# Patient Record
Sex: MALE | Race: WHITE | NOT HISPANIC OR LATINO | Employment: FULL TIME | URBAN - METROPOLITAN AREA
[De-identification: names, ages, dates, MRNs, and addresses within clinical notes are randomized per-mention and may not be internally consistent; named-entity substitution may affect disease eponyms.]

---

## 2022-08-22 ENCOUNTER — TELEPHONE (OUTPATIENT)
Dept: SCHEDULING | Facility: IMAGING CENTER | Age: 72
End: 2022-08-22

## 2022-08-30 ENCOUNTER — HOSPITAL ENCOUNTER (OUTPATIENT)
Dept: LAB | Facility: MEDICAL CENTER | Age: 72
End: 2022-08-30
Payer: MEDICARE

## 2022-08-30 ENCOUNTER — OFFICE VISIT (OUTPATIENT)
Dept: MEDICAL GROUP | Facility: MEDICAL CENTER | Age: 72
End: 2022-08-30
Payer: MEDICARE

## 2022-08-30 ENCOUNTER — HOSPITAL ENCOUNTER (OUTPATIENT)
Dept: RADIOLOGY | Facility: MEDICAL CENTER | Age: 72
End: 2022-08-30
Payer: MEDICARE

## 2022-08-30 VITALS
BODY MASS INDEX: 26.22 KG/M2 | HEART RATE: 64 BPM | SYSTOLIC BLOOD PRESSURE: 120 MMHG | OXYGEN SATURATION: 97 % | WEIGHT: 177 LBS | RESPIRATION RATE: 16 BRPM | HEIGHT: 69 IN | TEMPERATURE: 97.8 F | DIASTOLIC BLOOD PRESSURE: 76 MMHG

## 2022-08-30 DIAGNOSIS — C09.9 MALIGNANT NEOPLASM OF TONSIL (HCC): ICD-10-CM

## 2022-08-30 DIAGNOSIS — I10 ESSENTIAL (PRIMARY) HYPERTENSION: ICD-10-CM

## 2022-08-30 DIAGNOSIS — G89.29 CHRONIC RIGHT-SIDED LOW BACK PAIN WITH RIGHT-SIDED SCIATICA: ICD-10-CM

## 2022-08-30 DIAGNOSIS — E03.9 HYPOTHYROIDISM, UNSPECIFIED TYPE: ICD-10-CM

## 2022-08-30 DIAGNOSIS — M54.41 CHRONIC RIGHT-SIDED LOW BACK PAIN WITH RIGHT-SIDED SCIATICA: ICD-10-CM

## 2022-08-30 DIAGNOSIS — Z13.6 SCREENING FOR CARDIOVASCULAR CONDITION: ICD-10-CM

## 2022-08-30 DIAGNOSIS — M48.02 CERVICAL STENOSIS OF SPINE: ICD-10-CM

## 2022-08-30 DIAGNOSIS — Z85.818 HISTORY OF MALIGNANT NEOPLASM OF TONSIL: ICD-10-CM

## 2022-08-30 DIAGNOSIS — R04.0 BLEEDING NOSE: ICD-10-CM

## 2022-08-30 PROBLEM — K43.9 HERNIA OF ABDOMINAL WALL: Status: RESOLVED | Noted: 2022-08-30 | Resolved: 2022-08-30

## 2022-08-30 PROBLEM — K21.9 GERD (GASTROESOPHAGEAL REFLUX DISEASE): Status: ACTIVE | Noted: 2022-08-30

## 2022-08-30 PROBLEM — M54.9 BACK PAIN: Status: ACTIVE | Noted: 2022-08-30

## 2022-08-30 PROBLEM — N40.1 BENIGN PROSTATIC HYPERPLASIA (BPH) WITH POST-VOID DRIBBLING: Status: ACTIVE | Noted: 2022-08-30

## 2022-08-30 PROBLEM — J98.9 RESPIRATION DISORDER: Status: RESOLVED | Noted: 2022-08-30 | Resolved: 2022-08-30

## 2022-08-30 PROBLEM — N39.43 BENIGN PROSTATIC HYPERPLASIA (BPH) WITH POST-VOID DRIBBLING: Status: ACTIVE | Noted: 2022-08-30

## 2022-08-30 PROBLEM — G47.33 OBSTRUCTIVE SLEEP APNEA: Status: ACTIVE | Noted: 2022-08-30

## 2022-08-30 PROBLEM — J98.9 RESPIRATION DISORDER: Status: ACTIVE | Noted: 2022-08-30

## 2022-08-30 PROBLEM — M54.2 NECK PAIN: Status: ACTIVE | Noted: 2022-08-30

## 2022-08-30 PROBLEM — K43.9 HERNIA OF ABDOMINAL WALL: Status: ACTIVE | Noted: 2022-08-30

## 2022-08-30 LAB
ALBUMIN SERPL BCP-MCNC: 4.4 G/DL (ref 3.2–4.9)
ALBUMIN/GLOB SERPL: 1.7 G/DL
ALP SERPL-CCNC: 66 U/L (ref 30–99)
ALT SERPL-CCNC: 23 U/L (ref 2–50)
ANION GAP SERPL CALC-SCNC: 11 MMOL/L (ref 7–16)
AST SERPL-CCNC: 24 U/L (ref 12–45)
BILIRUB SERPL-MCNC: 0.6 MG/DL (ref 0.1–1.5)
BUN SERPL-MCNC: 16 MG/DL (ref 8–22)
CALCIUM SERPL-MCNC: 9.7 MG/DL (ref 8.5–10.5)
CHLORIDE SERPL-SCNC: 102 MMOL/L (ref 96–112)
CHOLEST SERPL-MCNC: 211 MG/DL (ref 100–199)
CO2 SERPL-SCNC: 27 MMOL/L (ref 20–33)
CREAT SERPL-MCNC: 0.95 MG/DL (ref 0.5–1.4)
ERYTHROCYTE [DISTWIDTH] IN BLOOD BY AUTOMATED COUNT: 47.5 FL (ref 35.9–50)
GFR SERPLBLD CREATININE-BSD FMLA CKD-EPI: 85 ML/MIN/1.73 M 2
GLOBULIN SER CALC-MCNC: 2.6 G/DL (ref 1.9–3.5)
GLUCOSE SERPL-MCNC: 94 MG/DL (ref 65–99)
HCT VFR BLD AUTO: 47 % (ref 42–52)
HDLC SERPL-MCNC: 33 MG/DL
HGB BLD-MCNC: 15.6 G/DL (ref 14–18)
LDLC SERPL CALC-MCNC: 156 MG/DL
MCH RBC QN AUTO: 29.9 PG (ref 27–33)
MCHC RBC AUTO-ENTMCNC: 33.2 G/DL (ref 33.7–35.3)
MCV RBC AUTO: 90.2 FL (ref 81.4–97.8)
PLATELET # BLD AUTO: 242 K/UL (ref 164–446)
PMV BLD AUTO: 11.8 FL (ref 9–12.9)
POTASSIUM SERPL-SCNC: 3.9 MMOL/L (ref 3.6–5.5)
PROT SERPL-MCNC: 7 G/DL (ref 6–8.2)
RBC # BLD AUTO: 5.21 M/UL (ref 4.7–6.1)
SODIUM SERPL-SCNC: 140 MMOL/L (ref 135–145)
TRIGL SERPL-MCNC: 111 MG/DL (ref 0–149)
TSH SERPL DL<=0.005 MIU/L-ACNC: 2.17 UIU/ML (ref 0.38–5.33)
WBC # BLD AUTO: 7.5 K/UL (ref 4.8–10.8)

## 2022-08-30 PROCEDURE — 99204 OFFICE O/P NEW MOD 45 MIN: CPT

## 2022-08-30 PROCEDURE — 36415 COLL VENOUS BLD VENIPUNCTURE: CPT

## 2022-08-30 PROCEDURE — 72040 X-RAY EXAM NECK SPINE 2-3 VW: CPT

## 2022-08-30 PROCEDURE — 85027 COMPLETE CBC AUTOMATED: CPT

## 2022-08-30 PROCEDURE — 80053 COMPREHEN METABOLIC PANEL: CPT

## 2022-08-30 PROCEDURE — 84443 ASSAY THYROID STIM HORMONE: CPT

## 2022-08-30 PROCEDURE — 80061 LIPID PANEL: CPT

## 2022-08-30 RX ORDER — CETIRIZINE HYDROCHLORIDE 10 MG/1
TABLET ORAL
COMMUNITY
Start: 2022-06-22 | End: 2023-07-19 | Stop reason: SDUPTHER

## 2022-08-30 RX ORDER — FLUTICASONE PROPIONATE 50 MCG
2 SPRAY, SUSPENSION (ML) NASAL DAILY
Qty: 16 G | Refills: 12 | Status: SHIPPED | OUTPATIENT
Start: 2022-08-30 | End: 2022-09-07 | Stop reason: SDUPTHER

## 2022-08-30 RX ORDER — TAMSULOSIN HYDROCHLORIDE 0.4 MG/1
CAPSULE ORAL
COMMUNITY
Start: 2022-06-08 | End: 2022-08-30

## 2022-08-30 RX ORDER — PANTOPRAZOLE SODIUM 40 MG/1
40 TABLET, DELAYED RELEASE ORAL
COMMUNITY
Start: 2022-06-09 | End: 2022-12-19 | Stop reason: SDUPTHER

## 2022-08-30 RX ORDER — THYROID 15 MG/1
15 TABLET ORAL DAILY
COMMUNITY
End: 2022-08-30

## 2022-08-30 RX ORDER — LEVOTHYROXINE SODIUM 150 MCG
TABLET ORAL
COMMUNITY
Start: 2022-06-08 | End: 2022-08-30

## 2022-08-30 RX ORDER — ASPIRIN 81 MG/1
81 TABLET ORAL SEE ADMIN INSTRUCTIONS
COMMUNITY

## 2022-08-30 RX ORDER — FLUTICASONE PROPIONATE 50 MCG
SPRAY, SUSPENSION (ML) NASAL DAILY
COMMUNITY
End: 2022-08-30 | Stop reason: SDUPTHER

## 2022-08-30 RX ORDER — LEVOTHYROXINE SODIUM 13 UG/1
150 CAPSULE ORAL
COMMUNITY
End: 2022-08-30 | Stop reason: SDUPTHER

## 2022-08-30 RX ORDER — MULTIVIT WITH MINERALS/LUTEIN
TABLET ORAL
COMMUNITY

## 2022-08-30 RX ORDER — FLUTICASONE PROPIONATE AND SALMETEROL XINAFOATE 230; 21 UG/1; UG/1
1 AEROSOL, METERED RESPIRATORY (INHALATION) 2 TIMES DAILY
Qty: 12 G | Refills: 12 | Status: SHIPPED | OUTPATIENT
Start: 2022-08-30 | End: 2022-08-31

## 2022-08-30 RX ORDER — NAPROXEN 500 MG/1
500 TABLET ORAL
COMMUNITY
End: 2022-08-30

## 2022-08-30 RX ORDER — CETIRIZINE HYDROCHLORIDE 10 MG/1
1 TABLET ORAL DAILY
COMMUNITY
Start: 2022-06-09 | End: 2022-08-30

## 2022-08-30 RX ORDER — MULTIVITAMIN WITH IRON
TABLET ORAL
COMMUNITY

## 2022-08-30 RX ORDER — PANTOPRAZOLE SODIUM 40 MG/1
TABLET, DELAYED RELEASE ORAL
COMMUNITY
Start: 2022-06-23 | End: 2022-08-30

## 2022-08-30 RX ORDER — AMLODIPINE BESYLATE AND ATORVASTATIN CALCIUM 5; 10 MG/1; MG/1
1 TABLET, FILM COATED ORAL DAILY
COMMUNITY
End: 2022-08-30

## 2022-08-30 RX ORDER — FLUTICASONE PROPIONATE AND SALMETEROL XINAFOATE 230; 21 UG/1; UG/1
2 AEROSOL, METERED RESPIRATORY (INHALATION)
COMMUNITY
Start: 2022-06-09 | End: 2022-08-30

## 2022-08-30 RX ORDER — PREDNISOLONE ACETATE 10 MG/ML
SUSPENSION/ DROPS OPHTHALMIC
COMMUNITY
Start: 2022-08-22 | End: 2023-03-13

## 2022-08-30 RX ORDER — FLUTICASONE PROPIONATE AND SALMETEROL XINAFOATE 230; 21 UG/1; UG/1
AEROSOL, METERED RESPIRATORY (INHALATION)
COMMUNITY
Start: 2022-06-22 | End: 2022-08-30 | Stop reason: SDUPTHER

## 2022-08-30 RX ORDER — AMLODIPINE BESYLATE 5 MG/1
5 TABLET ORAL DAILY
COMMUNITY
End: 2022-08-30 | Stop reason: SDUPTHER

## 2022-08-30 RX ORDER — AMLODIPINE BESYLATE 5 MG/1
5 TABLET ORAL DAILY
Qty: 90 TABLET | Refills: 3 | Status: SHIPPED | OUTPATIENT
Start: 2022-08-30 | End: 2023-03-30 | Stop reason: SDUPTHER

## 2022-08-30 RX ORDER — TAMSULOSIN HYDROCHLORIDE 0.4 MG/1
CAPSULE ORAL
COMMUNITY
Start: 2022-06-08

## 2022-08-30 RX ORDER — PANTOPRAZOLE SODIUM 40 MG/1
FOR SUSPENSION ORAL
COMMUNITY
End: 2022-08-30

## 2022-08-30 RX ORDER — OFLOXACIN 3 MG/ML
SOLUTION/ DROPS OPHTHALMIC
COMMUNITY
Start: 2022-08-15 | End: 2023-03-13

## 2022-08-30 RX ORDER — LEVOTHYROXINE SODIUM 13 UG/1
150 CAPSULE ORAL DAILY
Qty: 90 CAPSULE | Refills: 3 | Status: SHIPPED | OUTPATIENT
Start: 2022-08-30 | End: 2023-03-30 | Stop reason: SDUPTHER

## 2022-08-30 ASSESSMENT — ENCOUNTER SYMPTOMS
HEARTBURN: 0
VOMITING: 0
ABDOMINAL PAIN: 0
FEVER: 0
PALPITATIONS: 0
COUGH: 0
CHILLS: 0
NAUSEA: 1
SHORTNESS OF BREATH: 0
NECK PAIN: 1
ORTHOPNEA: 0

## 2022-08-30 ASSESSMENT — PATIENT HEALTH QUESTIONNAIRE - PHQ9: CLINICAL INTERPRETATION OF PHQ2 SCORE: 0

## 2022-08-31 RX ORDER — FLUTICASONE PROPIONATE AND SALMETEROL XINAFOATE 230; 21 UG/1; UG/1
2 AEROSOL, METERED RESPIRATORY (INHALATION) 2 TIMES DAILY
Qty: 12 G | Refills: 12 | Status: SHIPPED | OUTPATIENT
Start: 2022-08-31 | End: 2022-09-06 | Stop reason: SDUPTHER

## 2022-09-02 ENCOUNTER — SUPERVISING PHYSICIAN REVIEW (OUTPATIENT)
Dept: MEDICAL GROUP | Facility: MEDICAL CENTER | Age: 72
End: 2022-09-02
Payer: MEDICARE

## 2022-09-02 NOTE — PROGRESS NOTES
I have reviewed and agree with history, assessment, and plan for office encounter on 8/302022 with ALPESH Manzo.    Face to face encounter/direct observation: no    Suggested changes to plan or follow-up: none

## 2022-09-06 RX ORDER — FLUTICASONE PROPIONATE AND SALMETEROL XINAFOATE 230; 21 UG/1; UG/1
2 AEROSOL, METERED RESPIRATORY (INHALATION) 2 TIMES DAILY
Qty: 180 EACH | Refills: 3 | Status: SHIPPED | OUTPATIENT
Start: 2022-09-06 | End: 2023-10-09

## 2022-09-07 ENCOUNTER — HOSPITAL ENCOUNTER (OUTPATIENT)
Dept: RADIOLOGY | Facility: MEDICAL CENTER | Age: 72
End: 2022-09-07
Payer: MEDICARE

## 2022-09-07 RX ORDER — FLUTICASONE PROPIONATE 50 MCG
2 SPRAY, SUSPENSION (ML) NASAL DAILY
Qty: 48 G | Refills: 3 | Status: SHIPPED | OUTPATIENT
Start: 2022-09-07 | End: 2023-10-09

## 2022-09-08 ENCOUNTER — APPOINTMENT (OUTPATIENT)
Dept: RADIATION ONCOLOGY | Facility: MEDICAL CENTER | Age: 72
End: 2022-09-08
Attending: RADIOLOGY
Payer: MEDICARE

## 2022-09-27 ENCOUNTER — PHYSICAL THERAPY (OUTPATIENT)
Dept: PHYSICAL THERAPY | Facility: MEDICAL CENTER | Age: 72
End: 2022-09-27
Payer: MEDICARE

## 2022-09-27 DIAGNOSIS — M48.02 CERVICAL STENOSIS OF SPINE: ICD-10-CM

## 2022-09-27 PROCEDURE — 97110 THERAPEUTIC EXERCISES: CPT

## 2022-09-27 PROCEDURE — 97162 PT EVAL MOD COMPLEX 30 MIN: CPT

## 2022-09-27 ASSESSMENT — ENCOUNTER SYMPTOMS
PAIN SCALE AT LOWEST: 0
PAIN SCALE AT HIGHEST: 9
PAIN SCALE: 0

## 2022-09-27 NOTE — OP THERAPY EVALUATION
Outpatient Physical Therapy  INITIAL EVALUATION    [facility] Outpatient Physical Therapy  [address]  [place] [address]  Phone:  [phone]  Fax:  [phone]    Date of Evaluation: 09/27/2022    Patient: Randall Copeland  YOB: 1950  MRN: [id number]     Referring Provider: MELVIN Sandoval  [address]  [place] [address]   Referring Diagnosis Cervical stenosis of spine [M48.02]     Time Calculation  Start time: 0900  Stop time: 0943 Time Calculation (min): 43 minutes         Chief Complaint: Neck Problem    Visit Diagnoses     ICD-10-CM   1. Cervical stenosis of spine  M48.02       Date of onset of impairment: No data found    Subjective:   History of Present Illness:     Mechanism of injury:  Patient is a 71 year old male with a PMH including: GERD, depression, obstructive sleep apnea, hypothyroidism, malignant neoplasm tonsil (11/13)-pt had tonsillectomy; pt has ENT appt in Nov/late Oct. No O2/CPAP.    Pt presents to therapy with complaints of worsening neck pain, which initiated 3-4 years ago. This has been occurring intermittently over the years. Pt describing s/s usually occur with movement; sometimes with sitting at rest. Reports the s/s are unpredictable. Overall, reports worsening are stable. Denies numbness/tingling in extremities. Pain is described posterior to R ear at base of skull. Pt reports he did have radiation to R jaw  and R base of skull, reports he did have extensive radiation to this area. Pt reporting has tightness in both sides of jaw which occurs intermittently. Pt reporting she does have intermittent signs of pins/needles in chest, back, and legs (occurred 6 months ago) sometimes triggered by heat and stress.    Currently denies changes in bowel and bladder, saddle anesthesia, significant weight changes, chills/night sweats, nausea and vomiting, and unexplained fatigue. Pt currently denies: Dizziness,  diploplia, dysphasia, dysarthria, drop attacks, nausea, nystagmus, vision changes.      Headache comments: reports tension headaches on R  Sleep disturbance:  Not disrupted  Pain:     Current pain ratin    At best pain ratin    At worst pain ratin    Location:  R sided neck base of skull behind R ear    Quality: sore.    Progression:  Stable    Pain Comments::  Aggravatinx/day, rotation to R intermittently, driving,     Relieving: turning to the L stretching in rotation and circumduction of neck   Diagnostic Tests:     Diagnostic Tests Comments:  Cervical x ray 22:  FINDINGS:     The bones are diffusely demineralized.  There is no evidence of acute fracture.  There is no gross malalignment. There is slight degenerative anterolisthesis of C4 on C5 and C5 on C6.  There is loss of intervertebral disc height throughout the cervical spine, relatively sparing C2-C3 and C3-C4 and most severe at C4-C5.  There are changes of facet arthropathy.  No soft tissue abnormality is identified.     IMPRESSION:     1.  Multilevel multifactorial degenerative changes  2.  Osteopenia    Treatments:     None      Treatment Comments:  Self massage and stretching  Activities of Daily Living:     Patient reported ADL status: Patient's current daily routine includes:  Work: manager -full time; sitting for work (most time sitting)  Exercise: No current exercise routine in place; was completing resistance training but has not completed in a few weeks due to medical reasons.     Indep with ADL's.        Patient Goals:     Patient goals for therapy:  Decreased pain    Past Medical History:   Diagnosis Date    Benign prostatic hyperplasia (BPH) with post-void dribbling     Bleeding nose     Cervical stenosis of spine     Essential (primary) hypertension     GERD (gastroesophageal reflux disease)     Hernia of abdominal wall     inguinal    Hypothyroid     Malignant neoplasm of tonsil (HCC) 2013    Respiration disorder      idiopathic upper airway disease     No past surgical history on file.  Social History     Tobacco Use    Smoking status: Never    Smokeless tobacco: Never   Substance Use Topics    Alcohol use: Not on file     Comment: occasionally     Family and Occupational History     Socioeconomic History    Marital status:      Spouse name: Not on file    Number of children: Not on file    Years of education: Not on file    Highest education level: Not on file   Occupational History    Not on file       Objective     Postural Observations  Correction of posture: has no consistent effect    Additional Postural Observation Details  Forward head and rounded shoulders    Shoulder Screen    Shoulder active range of motion within functional limits.  Shoulder range of motion within functional limits with the following exceptions: Hand to full flexion, hand BTB and BTH WFL   Thoracic mobility limited in sagittal plane    Neurological Testing     Dermatome testing   Cervical (left)   All left cervical dermatomes intact    Cervical (right)   All right cervical dermatomes intact    Palpation     Additional Palpation Details  Scar tissue at R jawline  No TTP to palpation at TMJ  TTP R c/s paraspinals and at Tp's of c/s throughout    Active Range of Motion     Cervical Spine   Flexion: within functional limits  Extension: decreased  Retraction: decreased  Left lateral flexion: Active left cervical lateral flexion: 24 deg.  Right lateral flexion: Active right cervical lateral flexion: 23 deg; reproduces pain.  Left rotation: Active left cervical rotation: 44 deg.  Right rotation: Active right cervical rotation: 45 deg; ERP.      Therapeutic Exercises (CPT 12196):     1. pt education, re: PT exam findings    2. posture education, x2 min, hep    Time-based treatments/modalities:    Physical Therapy Timed Treatment Charges  Therapeutic exercise minutes (CPT 62214): 13 minutes      Assessment, Response and Plan:   Impairments:  abnormal or restricted ROM, activity intolerance, impaired physical strength and lacks appropriate home exercise program    Assessment details:  Patient is a pleasant and cooperative 71 year old male who presents to therapy with PMH remarkable for malignant tonsil; he will be following up with radiation oncology in near future. Does have hx of extensive radiation to R side of neck/jaw to lymph node and unsure if this is related to s/s. Additional c/o R sided headaches. C/s x ray remarkable for: slight degenerative anterolisthesis of C4 on C5 and C5 on C6, degenerative changes and osteopenia. Pt currently working full time at [employer] job, sits majority of time. S/s reported to be stable. Exam findings suggestive of poor postural awareness with forward head and rounded shoulders, impaired c/s and t/s ROM, periscapular weakness. Pt may benefit from skilled physical therapy in order to address above impairments in order to improve QOL and return to reported ADL's.     Barriers to therapy:  Psychosocial  Prognosis: fair    Goals:   Short Term Goals:   1. Pt will be independent with written HEP.      Short term goal time span:  2-4 weeks      Long Term Goals:    1. Pt will be independent with written HEP.  2. Pt will have a sig improvement in NDI score >/= MDC 5 pts or greater (eval: 9)  3. Pt will have decrease in HA's by 50% or greater to improve QOL and work related tasks.  4. Pt will be able to rotate head bilaterally without increase in baseline s/s in order to drive safely and back up the car.      Long term goal time span:  6-8 weeks    Plan:   Therapy options:  Physical therapy treatment to continue  Planned therapy interventions:  Neuromuscular Re-education (CPT 25794), Manual Therapy (CPT 99187), Therapeutic Exercise (CPT 98061) and Therapeutic Activities (CPT 51919)  Frequency: 1-2x/wk.  Duration in weeks:  8  Discussed with:  Patient  Plan details:  UPOC: 11/25/22    Functional Assessment Used  Neck Disability  Total: 9     Referring provider co-signature:  I have reviewed this plan of care and my co-signature certifies the need for services.    Certification Period: 09/27/2022 to  11/25/22    Physician Signature: ________________________________ Date: ______________

## 2022-10-06 ENCOUNTER — PHYSICAL THERAPY (OUTPATIENT)
Dept: PHYSICAL THERAPY | Facility: MEDICAL CENTER | Age: 72
End: 2022-10-06
Payer: MEDICARE

## 2022-10-06 DIAGNOSIS — M48.02 CERVICAL STENOSIS OF SPINE: ICD-10-CM

## 2022-10-06 PROCEDURE — 97110 THERAPEUTIC EXERCISES: CPT

## 2022-10-06 PROCEDURE — 97140 MANUAL THERAPY 1/> REGIONS: CPT

## 2022-10-06 NOTE — OP THERAPY DAILY TREATMENT
"  Outpatient Physical Therapy  DAILY TREATMENT     [facility] Outpatient Physical Therapy  [address]  [place] [address]  Phone:  [phone]  Fax:  [phone]    Date: 10/06/2022    Patient: Randall Copeland  YOB: 1950  MRN: [id number]     Time Calculation    Start time: 0948  Stop time: 1041 Time Calculation (min): 53 minutes         Chief Complaint: Neck Problem    Visit #: 2    SUBJECTIVE:  Pt reporting about 1-2/10 today; does not have any sharp pain and is not \"triggered.\" Pain is triggered grossly 3x/wk.     OBJECTIVE:  Current objective measures:       Thoracic mobility limited in sagittal and transverse plane  Poor postural awareness    From eval:  Active Range of Motion      Cervical Spine   Flexion: within functional limits  Extension: decreased  Retraction: decreased  Left lateral flexion: Active left cervical lateral flexion: 24 deg.  Right lateral flexion: Active right cervical lateral flexion: 23 deg; reproduces pain.  Left rotation: Active left cervical rotation: 44 deg.  Right rotation: Active right cervical rotation: 45 deg; ERP.    Therapeutic Exercises (CPT 28808):     1. pt education, re: PT exam findings    2. posture education, x2 min, reviewed    3. UBE, x5 min; rodolfo every 60 sec, cardiovascular warm up    4. t/s rotations in SL, x15 L 2x15 R, restricted to R>L; hep    5. open book pec stretch, alt UE's in hooklying, x 20x ea, open book>hep    7. flasher, orange>pink TB x2 min ea, hep    8. pt education, re: emphasis with trial of PT on strength and ROM      Therapeutic Exercise Summary: Access Code: [email or web address]  URL: [email or web address]  Date: 10/06/2022  Prepared by: Vel Pelaez    Exercises  Sidelying Thoracic Lumbar Rotation - 2 x daily - 7 x weekly - 2 sets - 15 reps  Open Book Chest Rotation Stretch on Foam 1/2 Roll - 2 x daily - 7 x weekly - 2 sets - 15 reps  Shoulder External Rotation and Scapular Retraction with " "Resistance - 2-3 x daily - 7 x weekly    Pt performed these exercises with instruction and SPV.  Provided handout with these exercises for daily HEP.        Time-based treatments/modalities:    Physical Therapy Timed Treatment Charges  Manual therapy minutes (CPT 06316): 12 minutes  Therapeutic exercise minutes (CPT 83975): 41 minutes      Pain rating (1-10) before treatment:  1-2/10  Pain rating (1-10) after treatment:  \"feels looser\"  Pain location:  Pain is described posterior to R ear at base of skull. Intermit tension VEGAS's on R  Pain Comments: Aggravatinx/day, rotation to R intermittently, driving      ASSESSMENT:   Response to treatment:   Poor postural awareness while seated with frequent cuing for corrections while in clinic. Thorough education today re: relationship b/w shoulder girdle, cervical and thoracic spine. No increase in discomfort following session with improved transverse t/s mobility following rotational ex. Does require repeated cuing due to poor postural awareness in session. Will continue to focus on mobility, c/s and periscapular strengthening.       PLAN/RECOMMENDATIONS:   Plan for treatment: therapy treatment to continue next visit.  Planned interventions for next visit: continue with current treatment.  Review hep for compliance; add DNF ex       "

## 2022-10-11 ENCOUNTER — PHYSICAL THERAPY (OUTPATIENT)
Dept: PHYSICAL THERAPY | Facility: MEDICAL CENTER | Age: 72
End: 2022-10-11
Payer: MEDICARE

## 2022-10-11 DIAGNOSIS — M48.02 CERVICAL STENOSIS OF SPINE: ICD-10-CM

## 2022-10-11 PROCEDURE — 97110 THERAPEUTIC EXERCISES: CPT

## 2022-10-11 NOTE — OP THERAPY DAILY TREATMENT
Outpatient Physical Therapy  DAILY TREATMENT     [facility] Outpatient Physical Therapy  [address]  [place] [address]  Phone:  [phone]  Fax:  [phone]    Date: 10/11/2022    Patient: Randall Copeland  YOB: 1950  MRN: [id number]     Time Calculation    Start time: 1500  Stop time: 1532 Time Calculation (min): 32 minutes         Chief Complaint: Neck Problem    Visit #: 3    SUBJECTIVE:  Pt reporting was triggered on three occasions but still is unsure if there are changes.    OBJECTIVE:  Current objective measures:       Thoracic mobility limited in sagittal and transverse plane  Poor postural awareness    From eval:  Active Range of Motion      Cervical Spine   Flexion: within functional limits  Extension: decreased  Retraction: decreased  Left lateral flexion: Active left cervical lateral flexion: 24 deg.  Right lateral flexion: Active right cervical lateral flexion: 23 deg; reproduces pain.  Left rotation: Active left cervical rotation: 44 deg.  Right rotation: Active right cervical rotation: 45 deg; ERP.    Therapeutic Exercises (CPT 35712):     1. pt education, re: PT exam findings    2. posture education, x2 min, reviewed    3. UBE, x2.5 min; rodolfo every 60 sec, cardiovascular warm up    4. t/s rotations in SL, x15 B, restricted to R>L-improved compared to last session; reviewed    5. open book pec stretch, alt UE's in hooklying, x 20x ea, reviewed    6. DNF in lying, 10x5 sec hold, added to hep; fatigue and sore    7. flasher, orange>pink TB x2 min ea, demo for corrections    8. pt education, re: emphasis with trial of PT on strength and ROM, reviewed      Therapeutic Exercise Summary: Access Code: [email or web address]  URL: [email or web address]  Date: 10/06/2022  Prepared by: Vel Pelaez    Exercises  Sidelying Thoracic Lumbar Rotation - 2 x daily - 7 x weekly - 2 sets - 15 reps  Open Book Chest Rotation Stretch on Foam 1/2 Roll - 2 x daily - 7  "x weekly - 2 sets - 15 reps  Shoulder External Rotation and Scapular Retraction with Resistance - 2-3 x daily - 7 x weekly    Pt performed these exercises with instruction and SPV.  Provided handout with these exercises for daily HEP.        Time-based treatments/modalities:    Physical Therapy Timed Treatment Charges  Therapeutic exercise minutes (CPT 40305): 32 minutes      Pain rating (1-10) before treatment:  1-2/10  Pain rating (1-10) after treatment:  \"feels looser\"  Pain location:  Pain is described posterior to R ear at base of skull. Intermit tension VEGAS's on R  Pain Comments: Aggravatinx/day, rotation to R intermittently, driving      ASSESSMENT:   Response to treatment:   Abbreviated session due to fire alarm with delayed start time with pt.   Pt demonstrating mod carryover of flasher with cuing required; good carryover of remaining hep. Pt demo sig DNF weakness in lying with reports of B neck pain with attempting to lift head while in lying -will continue to strengthen as tolerated in addition to periscapular strengthening in upcoming sessions.      PLAN/RECOMMENDATIONS:   Plan for treatment: therapy treatment to continue next visit.  Planned interventions for next visit: continue with current treatment.  Review hep and DNF ex    "

## 2022-10-13 ENCOUNTER — PHYSICAL THERAPY (OUTPATIENT)
Dept: PHYSICAL THERAPY | Facility: MEDICAL CENTER | Age: 72
End: 2022-10-13
Payer: MEDICARE

## 2022-10-13 DIAGNOSIS — M48.02 CERVICAL STENOSIS OF SPINE: ICD-10-CM

## 2022-10-13 PROCEDURE — 97110 THERAPEUTIC EXERCISES: CPT

## 2022-10-13 NOTE — OP THERAPY DAILY TREATMENT
Outpatient Physical Therapy  DAILY TREATMENT     [facility] Outpatient Physical Therapy  [address]  [place] [address]  Phone:  [phone]  Fax:  [phone]    Date: 10/13/2022    Patient: Randall Copeland  YOB: 1950  MRN: [id number]     Time Calculation    Start time: 1135  Stop time: 1200 Time Calculation (min): 25 minutes         Chief Complaint: Neck Problem    Visit #: 4    SUBJECTIVE:  Patient reports that he was confused about appointment time and arrived 20 minutes late. No complaints since last treatment session. States that he continues to get cramps in his right suboccipital region at least 3 times a week. Seems to be associated with looking right, DNF exercise, and sometimes with driving. His goal is to decrease the frequency of neck cramping.     OBJECTIVE:  Current objective measures:           Therapeutic Exercises (CPT 70546):     6. DNF in lying, 10x5 sec hold, reviewed in supine, encouraged to perform at 50% of stretch to avoid cramping.    10. Rows, 2 x 10, pink to purple TB, added to HEP    11. Shoulder extension, 2 x 10, pink to purple TB, added to HEP    12. Pec stretch in corner, 2 x 30 seconds, added to HEP      Therapeutic Exercise Summary: Access Code: [email or web address]  URL: [email or web address]  Date: 10/06/2022  Prepared by: Vel Pelaez    Exercises  Sidelying Thoracic Lumbar Rotation - 2 x daily - 7 x weekly - 2 sets - 15 reps  Open Book Chest Rotation Stretch on Foam 1/2 Roll - 2 x daily - 7 x weekly - 2 sets - 15 reps  Shoulder External Rotation and Scapular Retraction with Resistance - 2-3 x daily - 7 x weekly    Pt performed these exercises with instruction and SPV.  Provided handout with these exercises for daily HEP.        Time-based treatments/modalities:    Physical Therapy Timed Treatment Charges  Therapeutic exercise minutes (CPT 52004): 25 minutes      ASSESSMENT:   Response to treatment: Session was shorted due  to patient arrival time. Updated HEP to improve posture. Patient tolerated well without increase in pain or neck cramping.     PLAN/RECOMMENDATIONS:   Plan for treatment: therapy treatment to continue next visit.  Planned interventions for next visit: continue with current treatment.

## 2022-10-20 ENCOUNTER — PHYSICAL THERAPY (OUTPATIENT)
Dept: PHYSICAL THERAPY | Facility: MEDICAL CENTER | Age: 72
End: 2022-10-20
Payer: MEDICARE

## 2022-10-20 DIAGNOSIS — M48.02 CERVICAL STENOSIS OF SPINE: ICD-10-CM

## 2022-10-20 PROCEDURE — 97110 THERAPEUTIC EXERCISES: CPT

## 2022-10-20 NOTE — OP THERAPY DAILY TREATMENT
Outpatient Physical Therapy  DAILY TREATMENT     [facility] Outpatient Physical Therapy  [address]  [place] [address]  Phone:  [phone]  Fax:  [phone]    Date: 10/20/2022    Patient: Randall Copeland  YOB: 1950  MRN: [id number]     Time Calculation    Start time: 0817              Chief Complaint: Neck Problem    Visit #: 5    SUBJECTIVE:  Pt presenting to therapy stating that he does not know if its incidental but has noticed decreased frequency of pain. Reports he is able to avoid having acute cramping pain by completing his exercises.      OBJECTIVE:  Current objective measures:       Forward head position    Therapeutic Exercises (CPT 56767):     1. UBE, x4 min alt every 60 sec, cardiovascular warm up    6. DNF in lying->changed to sitting, 10x5 sec hold, changed to sitting position due to cramping in lying position    9. TRX rows, x10, good form without sig cuing required    10. Rows, 1 x 10, pink to purple TB; x5 sec hold, reviewed; VC's for knee bend, tall posture due to leaning    11. lat pull downs, 1 x 10, pink to purple TB; x5 sec hold, reviewed    12. Pec stretch in corner, 2 x 30 seconds, reviewed    13. c/s retractions    14. pt education, re: trigger finger, dupuytren's; offered OT referral - pt politely declining; reporting will contact MD if starting to impact his ADL's      Therapeutic Exercise Summary: Access Code: [email or web address]  URL: [email or web address]  Date: 10/06/2022  Prepared by: Vel Pelaez    Exercises  Sidelying Thoracic Lumbar Rotation - 2 x daily - 7 x weekly - 2 sets - 15 reps  Open Book Chest Rotation Stretch on Foam 1/2 Roll - 2 x daily - 7 x weekly - 2 sets - 15 reps  Shoulder External Rotation and Scapular Retraction with Resistance - 2-3 x daily - 7 x weekly    Pt performed these exercises with instruction and SPV.  Provided handout with these exercises for daily HEP.        Time-based  treatments/modalities:           ASSESSMENT:   Response to treatment:   Pt reporting positive impact on ADL's including less cramping frequency and is more mindful with posture. No increase in s/s with exercises performed in session with only mild cuing required for posture and stability during banded ex. Modified DNF in lying to sitting due to cramping consistently with hep and added hold time to TB in order to work on endurance training.     Pt requesting to work indep on hep next 30 days and will follow up if needed. Discussed closing referral if no contact in 30 days with pt in agreement to plan.    PLAN/RECOMMENDATIONS:   Plan for treatment: therapy treatment to continue next visit.  Planned interventions for next visit: continue with current treatment   Follow up if needed within next 30 days

## 2022-11-10 ENCOUNTER — PATIENT MESSAGE (OUTPATIENT)
Dept: HEALTH INFORMATION MANAGEMENT | Facility: OTHER | Age: 72
End: 2022-11-10

## 2022-11-10 ENCOUNTER — OFFICE VISIT (OUTPATIENT)
Dept: CARDIOLOGY | Facility: MEDICAL CENTER | Age: 72
End: 2022-11-10
Payer: MEDICARE

## 2022-11-10 ENCOUNTER — PATIENT MESSAGE (OUTPATIENT)
Dept: CARDIOLOGY | Facility: MEDICAL CENTER | Age: 72
End: 2022-11-10

## 2022-11-10 VITALS — WEIGHT: 177 LBS | BODY MASS INDEX: 26.22 KG/M2 | HEIGHT: 69 IN

## 2022-11-10 DIAGNOSIS — I10 ESSENTIAL (PRIMARY) HYPERTENSION: ICD-10-CM

## 2022-11-10 DIAGNOSIS — E78.6 LOW HDL (UNDER 40): ICD-10-CM

## 2022-11-10 DIAGNOSIS — E78.2 MIXED HYPERLIPIDEMIA: ICD-10-CM

## 2022-11-10 DIAGNOSIS — G47.33 OBSTRUCTIVE SLEEP APNEA: ICD-10-CM

## 2022-11-10 DIAGNOSIS — I10 HYPERTENSION, UNSPECIFIED TYPE: ICD-10-CM

## 2022-11-10 PROCEDURE — 99204 OFFICE O/P NEW MOD 45 MIN: CPT | Performed by: INTERNAL MEDICINE

## 2022-11-10 PROCEDURE — 93000 ELECTROCARDIOGRAM COMPLETE: CPT | Performed by: INTERNAL MEDICINE

## 2022-11-10 ASSESSMENT — ENCOUNTER SYMPTOMS
HEMOPTYSIS: 0
STRIDOR: 0
CHILLS: 0
FEVER: 0
COUGH: 0
PND: 0
MUSCULOSKELETAL NEGATIVE: 1
PALPITATIONS: 0
CLAUDICATION: 0
ORTHOPNEA: 0
LOSS OF CONSCIOUSNESS: 0
DIZZINESS: 0
WHEEZING: 0
CONSTITUTIONAL NEGATIVE: 1
RESPIRATORY NEGATIVE: 1
SHORTNESS OF BREATH: 0
EYES NEGATIVE: 1
GASTROINTESTINAL NEGATIVE: 1
BRUISES/BLEEDS EASILY: 0
SPUTUM PRODUCTION: 0
WEAKNESS: 0
NEUROLOGICAL NEGATIVE: 1
SORE THROAT: 0
CARDIOVASCULAR NEGATIVE: 1

## 2022-11-10 ASSESSMENT — FIBROSIS 4 INDEX: FIB4 SCORE: 1.51

## 2022-11-10 NOTE — PROGRESS NOTES
Chief Complaint   Patient presents with    Hypertension       Subjective     Randall Copeland is a 72 y.o. male who presents today as a new consultation for high cholesterol and cardiac risk.  He is a 72-year-old male with a lifelong history of low HDL and elevated cholesterol.  He had a CT scan recently that showed some plaque in the left main and LAD though probably low risk.  He has no chest pain at risk.  He has no premature family history of heart disease.  He does not have high blood pressure and is normally under good medical control.    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 No pertinent surgical history.  History reviewed. No pertinent family history.  Social History     Socioeconomic History    Marital status:      Spouse name: Not on file    Number of children: Not on file    Years of education: Not on file    Highest education level: Not on file   Occupational History    Not on file   Tobacco Use    Smoking status: Never    Smokeless tobacco: Never   Vaping Use    Vaping Use: Never used   Substance and Sexual Activity    Alcohol use: Not on file     Comment: occasionally    Drug use: Never    Sexual activity: Not on file   Other Topics Concern    Not on file   Social History Narrative    Not on file     Social Determinants of Health     Financial Resource Strain: Not on file   Food Insecurity: Not on file   Transportation Needs: Not on file   Physical Activity: Not on file   Stress: Not on file   Social Connections: Not on file   Intimate Partner Violence: Not on file   Housing Stability: Not on file     No Known  Allergies  Outpatient Encounter Medications as of 11/10/2022   Medication Sig Dispense Refill    fluticasone (FLONASE) 50 MCG/ACT nasal spray Administer 2 Sprays into affected nostril(S) every day. 48 g 3    ADVAIR -21 MCG/ACT inhaler Inhale 2 Puffs 2 times a day. 180 Each 3    tamsulosin (FLOMAX) 0.4 MG capsule TAKE 1 CAPSULE DAILY (FOLLOW UP FOR ADDITIONAL REFILLS)      prednisoLONE acetate (PRED FORTE) 1 % Suspension       ofloxacin (OCUFLOX) 0.3 % Solution       aspirin 81 MG EC tablet Take 1 Tablet by mouth see administration instructions. Monday, Wednesday, Friday      cetirizine (ZYRTEC) 10 MG Tab       cyanocobalamin (VITAMIN B12) 1000 MCG Tab Take 1 Tablet by mouth.      Multiple Vitamins-Minerals (CENTRUM SILVER) Tab Take  by mouth.      Magnesium 250 MG Tab Take  by mouth.      Dentifrices (RA FLUORIDE TOOTHPASTE) 0.15 % Paste Apply  to teeth.      amLODIPine (NORVASC) 5 MG Tab Take 1 Tablet by mouth every day. 90 Tablet 3    Levothyroxine Sodium 150 MCG Cap Take 150 mcg by mouth every day. 90 Capsule 3    [DISCONTINUED] pantoprazole (PROTONIX) 40 MG Tablet Delayed Response Take 1 Tablet by mouth.       No facility-administered encounter medications on file as of 11/10/2022.     Review of Systems   Constitutional: Negative.  Negative for chills, fever and malaise/fatigue.   HENT: Negative.  Negative for sore throat.    Eyes: Negative.    Respiratory: Negative.  Negative for cough, hemoptysis, sputum production, shortness of breath, wheezing and stridor.    Cardiovascular: Negative.  Negative for chest pain, palpitations, orthopnea, claudication, leg swelling and PND.   Gastrointestinal: Negative.    Genitourinary: Negative.    Musculoskeletal: Negative.    Skin: Negative.    Neurological: Negative.  Negative for dizziness, loss of consciousness and weakness.   Endo/Heme/Allergies: Negative.  Does not bruise/bleed easily.   All other systems reviewed and are negative.           Objective     BP (!)  "(P) 142/66 (BP Location: Left arm, Patient Position: Sitting, BP Cuff Size: Adult)   Pulse (P) 77   Resp (P) 16   Ht 1.753 m (5' 9\")   Wt 80.3 kg (177 lb)   SpO2 (P) 99%   BMI 26.14 kg/m²     Physical Exam  Vitals and nursing note reviewed.   Constitutional:       General: He is not in acute distress.     Appearance: He is well-developed. He is not diaphoretic.   HENT:      Head: Normocephalic and atraumatic.      Right Ear: External ear normal.      Left Ear: External ear normal.      Nose: Nose normal.      Mouth/Throat:      Pharynx: No oropharyngeal exudate.   Eyes:      General: No scleral icterus.        Right eye: No discharge.         Left eye: No discharge.      Conjunctiva/sclera: Conjunctivae normal.      Pupils: Pupils are equal, round, and reactive to light.   Neck:      Vascular: No JVD.   Cardiovascular:      Rate and Rhythm: Normal rate and regular rhythm.      Heart sounds: No murmur heard.    No friction rub. No gallop.   Pulmonary:      Effort: Pulmonary effort is normal. No respiratory distress.      Breath sounds: No stridor. No wheezing or rales.   Chest:      Chest wall: No tenderness.   Abdominal:      General: There is no distension.      Palpations: Abdomen is soft.      Tenderness: There is no guarding.   Musculoskeletal:         General: No tenderness or deformity. Normal range of motion.      Cervical back: Neck supple.   Skin:     General: Skin is warm and dry.      Coloration: Skin is not pale.      Findings: No erythema or rash.   Neurological:      Mental Status: He is alert.      Cranial Nerves: No cranial nerve deficit.      Motor: No abnormal muscle tone.      Coordination: Coordination normal.      Deep Tendon Reflexes: Reflexes are normal and symmetric. Reflexes normal.   Psychiatric:         Behavior: Behavior normal.         Thought Content: Thought content normal.         Judgment: Judgment normal.              Assessment & Plan     1. Hypertension, unspecified type  " EKG      2. Essential (primary) hypertension  CT-CARDIAC SCORING    Lipid Profile    Comp Metabolic Panel      3. Obstructive sleep apnea  CT-CARDIAC SCORING    Lipid Profile    Comp Metabolic Panel      4. Low HDL (under 40)        5. Mixed hyperlipidemia            Medical Decision Making: Today's Assessment/Status/Plan:        72-year-old male with plaque in his CT scan of his left main and LAD with high cholesterol and low HDL.  We discussed at length risk benefits alternatives of medical therapy.  He is hesitant to start medications due to concerns that is read about statins.  We will get a formal calcium score.  We will repeat his lipid profile after he tries diet and lifestyle modification.  I will see him back in 3 months.

## 2022-11-11 LAB — EKG IMPRESSION: NORMAL

## 2022-12-19 ENCOUNTER — PATIENT MESSAGE (OUTPATIENT)
Dept: CARDIOLOGY | Facility: MEDICAL CENTER | Age: 72
End: 2022-12-19
Payer: MEDICARE

## 2022-12-19 RX ORDER — PANTOPRAZOLE SODIUM 40 MG/1
40 TABLET, DELAYED RELEASE ORAL DAILY
Qty: 90 TABLET | Refills: 3 | Status: SHIPPED | OUTPATIENT
Start: 2022-12-19 | End: 2022-12-28 | Stop reason: SDUPTHER

## 2022-12-20 ENCOUNTER — PATIENT MESSAGE (OUTPATIENT)
Dept: CARDIOLOGY | Facility: MEDICAL CENTER | Age: 72
End: 2022-12-20
Payer: MEDICARE

## 2022-12-20 ENCOUNTER — TELEPHONE (OUTPATIENT)
Dept: CARDIOLOGY | Facility: MEDICAL CENTER | Age: 72
End: 2022-12-20

## 2022-12-20 NOTE — TELEPHONE ENCOUNTER
Kiki    Caller: Randall Copeland     Topic/issue: Pt has left a my chart message on 11/10/2022 and again on 12/19/2022. He has not heard from anyone and he is wanting a call back to discuss some issues that he is having.He is concerned about some labs that he had done. Please reach out to advise.     Callback Number: [phone] (home)      Thank you    Bianka FAN

## 2022-12-20 NOTE — TELEPHONE ENCOUNTER
Feel free to take it out.  It was likely just an error that I clicked on it, nothing more.  If you send me the date I saw him I can remove it. O-T Advancement Flap Text: The defect edges were debeveled with a #15 scalpel blade.  Given the location of the defect, shape of the defect and the proximity to free margins an O-T advancement flap was deemed most appropriate.  Using a sterile surgical marker, an appropriate advancement flap was drawn incorporating the defect and placing the expected incisions within the relaxed skin tension lines where possible.    The area thus outlined was incised deep to adipose tissue with a #15 scalpel blade.  The skin margins were undermined to an appropriate distance in all directions utilizing iris scissors.

## 2022-12-28 RX ORDER — PANTOPRAZOLE SODIUM 40 MG/1
40 TABLET, DELAYED RELEASE ORAL 2 TIMES DAILY
Qty: 180 TABLET | Refills: 3 | Status: SHIPPED | OUTPATIENT
Start: 2022-12-28 | End: 2023-12-28

## 2023-02-14 ENCOUNTER — HOSPITAL ENCOUNTER (OUTPATIENT)
Dept: LAB | Facility: MEDICAL CENTER | Age: 73
End: 2023-02-14
Attending: INTERNAL MEDICINE
Payer: MEDICARE

## 2023-02-14 DIAGNOSIS — I10 ESSENTIAL (PRIMARY) HYPERTENSION: ICD-10-CM

## 2023-02-14 DIAGNOSIS — G47.33 OBSTRUCTIVE SLEEP APNEA: ICD-10-CM

## 2023-02-14 LAB
ALBUMIN SERPL BCP-MCNC: 4.5 G/DL (ref 3.2–4.9)
ALBUMIN/GLOB SERPL: 1.8 G/DL
ALP SERPL-CCNC: 61 U/L (ref 30–99)
ALT SERPL-CCNC: 26 U/L (ref 2–50)
ANION GAP SERPL CALC-SCNC: 14 MMOL/L (ref 7–16)
AST SERPL-CCNC: 30 U/L (ref 12–45)
BILIRUB SERPL-MCNC: 0.6 MG/DL (ref 0.1–1.5)
BUN SERPL-MCNC: 14 MG/DL (ref 8–22)
CALCIUM ALBUM COR SERPL-MCNC: 9.2 MG/DL (ref 8.5–10.5)
CALCIUM SERPL-MCNC: 9.6 MG/DL (ref 8.4–10.2)
CHLORIDE SERPL-SCNC: 103 MMOL/L (ref 96–112)
CHOLEST SERPL-MCNC: 214 MG/DL (ref 100–199)
CO2 SERPL-SCNC: 26 MMOL/L (ref 20–33)
CREAT SERPL-MCNC: 0.87 MG/DL (ref 0.5–1.4)
FASTING STATUS PATIENT QL REPORTED: NORMAL
GFR SERPLBLD CREATININE-BSD FMLA CKD-EPI: 91 ML/MIN/1.73 M 2
GLOBULIN SER CALC-MCNC: 2.5 G/DL (ref 1.9–3.5)
GLUCOSE SERPL-MCNC: 99 MG/DL (ref 65–99)
HDLC SERPL-MCNC: 35 MG/DL
LDLC SERPL CALC-MCNC: 157 MG/DL
POTASSIUM SERPL-SCNC: 3.7 MMOL/L (ref 3.6–5.5)
PROT SERPL-MCNC: 7 G/DL (ref 6–8.2)
SODIUM SERPL-SCNC: 143 MMOL/L (ref 135–145)
TRIGL SERPL-MCNC: 109 MG/DL (ref 0–149)

## 2023-02-14 PROCEDURE — 36415 COLL VENOUS BLD VENIPUNCTURE: CPT

## 2023-02-14 PROCEDURE — 80061 LIPID PANEL: CPT

## 2023-02-14 PROCEDURE — 80053 COMPREHEN METABOLIC PANEL: CPT

## 2023-02-16 ENCOUNTER — HOSPITAL ENCOUNTER (OUTPATIENT)
Dept: RADIOLOGY | Facility: MEDICAL CENTER | Age: 73
End: 2023-02-16
Attending: INTERNAL MEDICINE
Payer: COMMERCIAL

## 2023-02-16 DIAGNOSIS — G47.33 OBSTRUCTIVE SLEEP APNEA: ICD-10-CM

## 2023-02-16 DIAGNOSIS — I10 ESSENTIAL (PRIMARY) HYPERTENSION: ICD-10-CM

## 2023-02-16 PROCEDURE — 4410556 CT-CARDIAC SCORING (SELF PAY ONLY)

## 2023-03-10 NOTE — PROGRESS NOTES
Cardiology Follow Up Note    Date of note: 3/9/2023    Primary Care Provider: MELVIN Sandoval    Patient Name: Randall Copeland  YOB: 1950  MRN:              [id number]    Reason for Followup: Former patient of Dr. Smith here for 3 months follow-up    History of Present Illness: Mr. Randall Copeland is a 72 y.o. male whose current medical problems include hypertension, hyperlipidemia, obstructive sleep apnea, coronary calcium elevation, hypothyroidism who is here for cardiac for follow up.  Patient is here to discuss his coronary calcium score.  He has with him multiple papers and articles which he wishes to discuss.  He is quite concerned about all the different opinions that cardiologists have in regards to statin recommendation.  He is also wondering why cardiologists are not more concerned about insulin resistance.  He is worried he is developing insulin resistance.  He feels from all the literature he is read that this is attributing more to potential coronary artery disease.      Patient was seen by Dr. Smith 11/10/2022 for cardiac risk.  Coronary calcium score was ordered.    Cardiovascular Risk Factors:  1. Smoking status:   Tobacco Use: Low Risk     Smoking Tobacco Use: Never    Smokeless Tobacco Use: Never    Passive Exposure: Not on file     2. Type II Diabetes Mellitus: No results found for: HBA1C not on meds  3. Hypertension: Yes on amlodipine 5 mg p.o. daily  4. Dyslipidemia: Currently not on any statin drugs  Cholesterol,Tot   Date Value Ref Range Status   02/14/2023 214 (H) 100 - 199 mg/dL Final     LDL   Date Value Ref Range Status   02/14/2023 157 (H) <100 mg/dL Final     HDL   Date Value Ref Range Status   02/14/2023 35 (A) >=40 mg/dL Final     Triglycerides   Date Value Ref Range Status   02/14/2023 109 0 - 149 mg/dL Final     No problems updated.     History reviewed. No pertinent surgical history.     Current Outpatient Medications   Medication Sig Dispense Refill     "pantoprazole (PROTONIX) 40 MG Tablet Delayed Response Take 1 Tablet by mouth 2 times a day. 180 Tablet 3    fluticasone (FLONASE) 50 MCG/ACT nasal spray Administer 2 Sprays into affected nostril(S) every day. 48 g 3    ADVAIR -21 MCG/ACT inhaler Inhale 2 Puffs 2 times a day. 180 Each 3    tamsulosin (FLOMAX) 0.4 MG capsule TAKE 1 CAPSULE DAILY (FOLLOW UP FOR ADDITIONAL REFILLS)      aspirin 81 MG EC tablet Take 1 Tablet by mouth see administration instructions. Monday, Wednesday, Friday      cetirizine (ZYRTEC) 10 MG Tab       cyanocobalamin (VITAMIN B12) 1000 MCG Tab Take 1 Tablet by mouth.      Multiple Vitamins-Minerals (CENTRUM SILVER) Tab Take  by mouth.      Magnesium 250 MG Tab Take  by mouth.      Dentifrices (RA FLUORIDE TOOTHPASTE) 0.15 % Paste Apply  to teeth.      amLODIPine (NORVASC) 5 MG Tab Take 1 Tablet by mouth every day. 90 Tablet 3    Levothyroxine Sodium 150 MCG Cap Take 150 mcg by mouth every day. 90 Capsule 3     No current facility-administered medications for this visit.     No Known Allergies    Review of Systems   Cardiovascular:  Negative for chest pain, dyspnea on exertion, leg swelling, near-syncope, orthopnea, palpitations, paroxysmal nocturnal dyspnea and syncope.   Respiratory: Negative.     Gastrointestinal:  Negative for hematochezia and melena.     Physical Exam:  Ambulatory Vitals  /80 (BP Location: Left arm, Patient Position: Sitting, BP Cuff Size: Adult)   Pulse 68   Resp 17   Ht 1.753 m (5' 9\")   Wt 78.5 kg (173 lb)   SpO2 98%    Oxygen Therapy:  Pulse Oximetry: 98 %  BP Readings from Last 4 Encounters:   03/13/23 128/80   11/10/22 (!) (P) 142/66   08/30/22 120/76     Weight/BMI:   Body mass index is 25.55 kg/m².  Wt Readings from Last 2 Encounters:   03/13/23 78.5 kg (173 lb)   11/10/22 80.3 kg (177 lb)     Physical Exam  Constitutional:       Appearance: Normal appearance.   Neck:      Vascular: No JVD.   Cardiovascular:      Rate and Rhythm: Normal rate and " regular rhythm.      Pulses: Normal pulses and intact distal pulses.      Heart sounds: Normal heart sounds, S1 normal and S2 normal. No murmur heard.    No friction rub. No S3 or S4 sounds.   Pulmonary:      Effort: Pulmonary effort is normal. No respiratory distress.      Breath sounds: Normal breath sounds. No wheezing or rales.   Musculoskeletal:      Cervical back: Neck supple.   Skin:     General: Skin is warm and dry.   Neurological:      Mental Status: He is alert.      Lab Data Review:  Lab Results   Component Value Date/Time    SODIUM 143 02/14/2023 12:44 PM    POTASSIUM 3.7 02/14/2023 12:44 PM    CHLORIDE 103 02/14/2023 12:44 PM    CO2 26 02/14/2023 12:44 PM    GLUCOSE 99 02/14/2023 12:44 PM    BUN 14 02/14/2023 12:44 PM    CREATININE 0.87 02/14/2023 12:44 PM     Lab Results   Component Value Date/Time    ALKPHOSPHAT 61 02/14/2023 12:44 PM    ASTSGOT 30 02/14/2023 12:44 PM    ALTSGPT 26 02/14/2023 12:44 PM    TBILIRUBIN 0.6 02/14/2023 12:44 PM      Lab Results   Component Value Date/Time    WBC 7.5 08/30/2022 12:08 PM     Lab Results   Component Value Date/Time    TSHULTRASEN 2.170 08/30/2022 12:08 PM       Cardiac Imaging and Procedures Review:    EKG dated 11/10/22: My personal interpretation is sinus rhythm, 70 bpm, rightward axis, abnormal R wave progression, consider lead placement, nonspecific ST changes, no prior ECG for comparison    Cardiac CT Calcium Score dated 2/16/23  LMA - 8.7  LCX - 0.0  LAD - 116.4  RCA - 6.6  Total Calcium Score: 131.7  The observed calcium score of 131.7 is at percentile 42 for subjects of the same age, gender, and race/ethnicity who are free of clinical cardiovascular disease and treated diabetes.    The estimated [place] 10-year risk for cardiovascular heart disease event for a person with this risk factor profile including coronary calcium is 15%.  The estimated 10-year risk of cardiovascular heart disease event for this risk factor profile if we did not factor in their  coronary artery calcium score would be 16.3%.  This has been discussed in detail with the patient.    Assessment & Plan     1.  Coronary artery disease based on his coronary artery calcium score.  I went over his coronary artery calcium score which actually puts him at the 42nd percentile for subjects of the same age gender race and the city who are free of cardiovascular disease and treated diabetes.  Therefore, his [place] 10-year risk for cardiovascular heart disease is actually 15% when you include the coronary calcium score.  I discussed with him I do not think the coronary calcium score has really changed his 10-year risk factor.  We used a different calculator on the ACC website, without using coronary calcium score, this 1 would indicate 27.1% and they would recommend maximally tolerated statins.  I discussed the various classic studies on statins which guides are continued recommendations for drugs.  Based on current ACC guidelines, we would recommend a statin drug and aim for LDL closer to 70.  Patient would like to try Mediterranean diet and exercise.  Attempted to answer all his other questions as best as I can.  - Mediterranean diet and exercise  - Follow-up lipid profile, comprehensive metabolic, I added CRP per patient request although we discussed is probably not going to change recommendations.  - Discussed patient could follow-up with his primary care provider in a year, but he would like to come back and see a cardiologist.    I spent 40 minutes total time caring for Mr. Randall Copeland during this appointment.  Over fifty percent was spent counseling the patient on their condition, best management practices, reviewing test results, risks and benefits of treatment and coordinating care.       Shared Medical Decision Making:  All of patient's questions were answered to the best of my knowledge and to patient's satisfaction. It was a pleasure seeing Mr. Randall Copeland in my clinic today. Return in about 1  year (around 3/13/2024). Patient is aware to call the cardiology clinic with any questions or concerns.    Venus Patel MD  [facility]  [address]  [place] [address]  Phone:  [phone]  Fax:  [phone]    Please note that this dictation was created using voice recognition software. I have made every reasonable attempt to correct obvious errors, but it is possible there are errors of grammar and possibly content that I did not discover before finalizing the note.

## 2023-03-13 ENCOUNTER — OFFICE VISIT (OUTPATIENT)
Dept: CARDIOLOGY | Facility: MEDICAL CENTER | Age: 73
End: 2023-03-13
Payer: MEDICARE

## 2023-03-13 VITALS
RESPIRATION RATE: 17 BRPM | OXYGEN SATURATION: 98 % | HEART RATE: 68 BPM | BODY MASS INDEX: 25.62 KG/M2 | DIASTOLIC BLOOD PRESSURE: 80 MMHG | HEIGHT: 69 IN | SYSTOLIC BLOOD PRESSURE: 128 MMHG | WEIGHT: 173 LBS

## 2023-03-13 DIAGNOSIS — R93.1 AGATSTON CAC SCORE 100-199: ICD-10-CM

## 2023-03-13 DIAGNOSIS — E78.2 MIXED HYPERLIPIDEMIA: ICD-10-CM

## 2023-03-13 PROCEDURE — 99215 OFFICE O/P EST HI 40 MIN: CPT | Performed by: INTERNAL MEDICINE

## 2023-03-13 ASSESSMENT — ENCOUNTER SYMPTOMS
NEAR-SYNCOPE: 0
PALPITATIONS: 0
HEMATOCHEZIA: 0
DYSPNEA ON EXERTION: 0
ORTHOPNEA: 0
PND: 0
RESPIRATORY NEGATIVE: 1
SYNCOPE: 0

## 2023-03-13 ASSESSMENT — FIBROSIS 4 INDEX: FIB4 SCORE: 1.77

## 2023-03-28 ENCOUNTER — TELEPHONE (OUTPATIENT)
Dept: HEALTH INFORMATION MANAGEMENT | Facility: OTHER | Age: 73
End: 2023-03-28
Payer: MEDICARE

## 2023-03-28 NOTE — TELEPHONE ENCOUNTER
1st attempt t o scheduled AWV for [employer] D[facility],   Verified HIPAA- scheduled after 4/18/23 per member request

## 2023-03-30 DIAGNOSIS — E03.9 HYPOTHYROIDISM, UNSPECIFIED TYPE: ICD-10-CM

## 2023-03-30 DIAGNOSIS — I10 ESSENTIAL (PRIMARY) HYPERTENSION: ICD-10-CM

## 2023-03-30 RX ORDER — LEVOTHYROXINE SODIUM 13 UG/1
150 CAPSULE ORAL DAILY
Qty: 90 CAPSULE | Refills: 0 | Status: SHIPPED | OUTPATIENT
Start: 2023-03-30 | End: 2023-04-19 | Stop reason: SDUPTHER

## 2023-03-30 RX ORDER — AMLODIPINE BESYLATE 5 MG/1
5 TABLET ORAL DAILY
Qty: 90 TABLET | Refills: 0 | Status: SHIPPED | OUTPATIENT
Start: 2023-03-30 | End: 2023-04-19 | Stop reason: SDUPTHER

## 2023-04-16 SDOH — ECONOMIC STABILITY: TRANSPORTATION INSECURITY
IN THE PAST 12 MONTHS, HAS LACK OF RELIABLE TRANSPORTATION KEPT YOU FROM MEDICAL APPOINTMENTS, MEETINGS, WORK OR FROM GETTING THINGS NEEDED FOR DAILY LIVING?: NO

## 2023-04-16 SDOH — ECONOMIC STABILITY: FOOD INSECURITY: WITHIN THE PAST 12 MONTHS, THE FOOD YOU BOUGHT JUST DIDN'T LAST AND YOU DIDN'T HAVE MONEY TO GET MORE.: NEVER TRUE

## 2023-04-16 SDOH — HEALTH STABILITY: PHYSICAL HEALTH: ON AVERAGE, HOW MANY DAYS PER WEEK DO YOU ENGAGE IN MODERATE TO STRENUOUS EXERCISE (LIKE A BRISK WALK)?: 0 DAYS

## 2023-04-16 SDOH — ECONOMIC STABILITY: HOUSING INSECURITY

## 2023-04-16 SDOH — ECONOMIC STABILITY: HOUSING INSECURITY
IN THE LAST 12 MONTHS, WAS THERE A TIME WHEN YOU DID NOT HAVE A STEADY PLACE TO SLEEP OR SLEPT IN A SHELTER (INCLUDING NOW)?: NO

## 2023-04-16 SDOH — ECONOMIC STABILITY: FOOD INSECURITY: WITHIN THE PAST 12 MONTHS, YOU WORRIED THAT YOUR FOOD WOULD RUN OUT BEFORE YOU GOT MONEY TO BUY MORE.: NEVER TRUE

## 2023-04-16 SDOH — ECONOMIC STABILITY: TRANSPORTATION INSECURITY
IN THE PAST 12 MONTHS, HAS LACK OF TRANSPORTATION KEPT YOU FROM MEETINGS, WORK, OR FROM GETTING THINGS NEEDED FOR DAILY LIVING?: NO

## 2023-04-16 SDOH — ECONOMIC STABILITY: INCOME INSECURITY: IN THE LAST 12 MONTHS, WAS THERE A TIME WHEN YOU WERE NOT ABLE TO PAY THE MORTGAGE OR RENT ON TIME?: NO

## 2023-04-16 SDOH — HEALTH STABILITY: PHYSICAL HEALTH: ON AVERAGE, HOW MANY MINUTES DO YOU ENGAGE IN EXERCISE AT THIS LEVEL?: 0 MIN

## 2023-04-16 SDOH — ECONOMIC STABILITY: INCOME INSECURITY: HOW HARD IS IT FOR YOU TO PAY FOR THE VERY BASICS LIKE FOOD, HOUSING, MEDICAL CARE, AND HEATING?: NOT HARD AT ALL

## 2023-04-16 SDOH — HEALTH STABILITY: MENTAL HEALTH
STRESS IS WHEN SOMEONE FEELS TENSE, NERVOUS, ANXIOUS, OR CAN'T SLEEP AT NIGHT BECAUSE THEIR MIND IS TROUBLED. HOW STRESSED ARE YOU?: NOT AT ALL

## 2023-04-16 SDOH — ECONOMIC STABILITY: TRANSPORTATION INSECURITY
IN THE PAST 12 MONTHS, HAS THE LACK OF TRANSPORTATION KEPT YOU FROM MEDICAL APPOINTMENTS OR FROM GETTING MEDICATIONS?: NO

## 2023-04-16 ASSESSMENT — SOCIAL DETERMINANTS OF HEALTH (SDOH)
WITHIN THE PAST 12 MONTHS, YOU WORRIED THAT YOUR FOOD WOULD RUN OUT BEFORE YOU GOT THE MONEY TO BUY MORE: NEVER TRUE
IN A TYPICAL WEEK, HOW MANY TIMES DO YOU TALK ON THE PHONE WITH FAMILY, FRIENDS, OR NEIGHBORS?: PATIENT DECLINED
HOW OFTEN DO YOU GET TOGETHER WITH FRIENDS OR RELATIVES?: PATIENT DECLINED
DO YOU BELONG TO ANY CLUBS OR ORGANIZATIONS SUCH AS CHURCH GROUPS UNIONS, FRATERNAL OR ATHLETIC GROUPS, OR SCHOOL GROUPS?: NO
HOW OFTEN DO YOU ATTEND CHURCH OR RELIGIOUS SERVICES?: NEVER
HOW OFTEN DO YOU GET TOGETHER WITH FRIENDS OR RELATIVES?: PATIENT DECLINED
HOW OFTEN DO YOU HAVE A DRINK CONTAINING ALCOHOL: MONTHLY OR LESS
HOW OFTEN DO YOU ATTEND CHURCH OR RELIGIOUS SERVICES?: NEVER
IN A TYPICAL WEEK, HOW MANY TIMES DO YOU TALK ON THE PHONE WITH FAMILY, FRIENDS, OR NEIGHBORS?: PATIENT DECLINED
HOW MANY DRINKS CONTAINING ALCOHOL DO YOU HAVE ON A TYPICAL DAY WHEN YOU ARE DRINKING: 1 OR 2
DO YOU BELONG TO ANY CLUBS OR ORGANIZATIONS SUCH AS CHURCH GROUPS UNIONS, FRATERNAL OR ATHLETIC GROUPS, OR SCHOOL GROUPS?: NO
HOW HARD IS IT FOR YOU TO PAY FOR THE VERY BASICS LIKE FOOD, HOUSING, MEDICAL CARE, AND HEATING?: NOT HARD AT ALL
HOW OFTEN DO YOU HAVE SIX OR MORE DRINKS ON ONE OCCASION: NEVER

## 2023-04-16 ASSESSMENT — LIFESTYLE VARIABLES
SKIP TO QUESTIONS 9-10: 1
HOW MANY STANDARD DRINKS CONTAINING ALCOHOL DO YOU HAVE ON A TYPICAL DAY: 1 OR 2
HOW OFTEN DO YOU HAVE A DRINK CONTAINING ALCOHOL: MONTHLY OR LESS
AUDIT-C TOTAL SCORE: 1
HOW OFTEN DO YOU HAVE SIX OR MORE DRINKS ON ONE OCCASION: NEVER

## 2023-04-19 ENCOUNTER — OFFICE VISIT (OUTPATIENT)
Dept: MEDICAL GROUP | Facility: MEDICAL CENTER | Age: 73
End: 2023-04-19
Payer: MEDICARE

## 2023-04-19 ENCOUNTER — HOSPITAL ENCOUNTER (OUTPATIENT)
Dept: LAB | Facility: MEDICAL CENTER | Age: 73
End: 2023-04-19
Attending: INTERNAL MEDICINE
Payer: MEDICARE

## 2023-04-19 VITALS
DIASTOLIC BLOOD PRESSURE: 68 MMHG | WEIGHT: 168 LBS | OXYGEN SATURATION: 97 % | SYSTOLIC BLOOD PRESSURE: 134 MMHG | HEIGHT: 69 IN | HEART RATE: 67 BPM | BODY MASS INDEX: 24.88 KG/M2 | TEMPERATURE: 98.1 F

## 2023-04-19 DIAGNOSIS — I10 ESSENTIAL (PRIMARY) HYPERTENSION: ICD-10-CM

## 2023-04-19 DIAGNOSIS — Z00.00 ENCOUNTER FOR MEDICARE ANNUAL WELLNESS EXAM: ICD-10-CM

## 2023-04-19 DIAGNOSIS — E78.2 MIXED HYPERLIPIDEMIA: ICD-10-CM

## 2023-04-19 DIAGNOSIS — K40.90 INGUINAL HERNIA, LEFT: ICD-10-CM

## 2023-04-19 DIAGNOSIS — E03.9 HYPOTHYROIDISM, UNSPECIFIED TYPE: ICD-10-CM

## 2023-04-19 DIAGNOSIS — Z23 NEED FOR VACCINATION: ICD-10-CM

## 2023-04-19 LAB
ALBUMIN SERPL BCP-MCNC: 4.4 G/DL (ref 3.2–4.9)
ALBUMIN/GLOB SERPL: 1.5 G/DL
ALP SERPL-CCNC: 62 U/L (ref 30–99)
ALT SERPL-CCNC: 26 U/L (ref 2–50)
ANION GAP SERPL CALC-SCNC: 12 MMOL/L (ref 7–16)
AST SERPL-CCNC: 27 U/L (ref 12–45)
BILIRUB SERPL-MCNC: 0.6 MG/DL (ref 0.1–1.5)
BUN SERPL-MCNC: 16 MG/DL (ref 8–22)
CALCIUM ALBUM COR SERPL-MCNC: 9.4 MG/DL (ref 8.5–10.5)
CALCIUM SERPL-MCNC: 9.7 MG/DL (ref 8.5–10.5)
CHLORIDE SERPL-SCNC: 104 MMOL/L (ref 96–112)
CHOLEST SERPL-MCNC: 235 MG/DL (ref 100–199)
CO2 SERPL-SCNC: 25 MMOL/L (ref 20–33)
CREAT SERPL-MCNC: 0.89 MG/DL (ref 0.5–1.4)
CRP SERPL HS-MCNC: 0.5 MG/L (ref 0–3)
FASTING STATUS PATIENT QL REPORTED: NORMAL
GFR SERPLBLD CREATININE-BSD FMLA CKD-EPI: 90 ML/MIN/1.73 M 2
GLOBULIN SER CALC-MCNC: 2.9 G/DL (ref 1.9–3.5)
GLUCOSE SERPL-MCNC: 86 MG/DL (ref 65–99)
HDLC SERPL-MCNC: 41 MG/DL
LDLC SERPL CALC-MCNC: 176 MG/DL
POTASSIUM SERPL-SCNC: 3.7 MMOL/L (ref 3.6–5.5)
PROT SERPL-MCNC: 7.3 G/DL (ref 6–8.2)
SODIUM SERPL-SCNC: 141 MMOL/L (ref 135–145)
TRIGL SERPL-MCNC: 90 MG/DL (ref 0–149)

## 2023-04-19 PROCEDURE — 90677 PCV20 VACCINE IM: CPT

## 2023-04-19 PROCEDURE — 80061 LIPID PANEL: CPT

## 2023-04-19 PROCEDURE — 86141 C-REACTIVE PROTEIN HS: CPT

## 2023-04-19 PROCEDURE — G0009 ADMIN PNEUMOCOCCAL VACCINE: HCPCS

## 2023-04-19 PROCEDURE — 36415 COLL VENOUS BLD VENIPUNCTURE: CPT

## 2023-04-19 PROCEDURE — 80053 COMPREHEN METABOLIC PANEL: CPT

## 2023-04-19 PROCEDURE — G0439 PPPS, SUBSEQ VISIT: HCPCS | Mod: 25

## 2023-04-19 RX ORDER — LEVOTHYROXINE SODIUM 13 UG/1
150 CAPSULE ORAL DAILY
Qty: 90 CAPSULE | Refills: 3 | Status: SHIPPED | OUTPATIENT
Start: 2023-04-19

## 2023-04-19 RX ORDER — AMLODIPINE BESYLATE 5 MG/1
5 TABLET ORAL DAILY
Qty: 90 TABLET | Refills: 3 | Status: SHIPPED | OUTPATIENT
Start: 2023-04-19 | End: 2023-06-26

## 2023-04-19 ASSESSMENT — ACTIVITIES OF DAILY LIVING (ADL): BATHING_REQUIRES_ASSISTANCE: 0

## 2023-04-19 ASSESSMENT — FIBROSIS 4 INDEX: FIB4 SCORE: 1.77

## 2023-04-19 ASSESSMENT — PATIENT HEALTH QUESTIONNAIRE - PHQ9: CLINICAL INTERPRETATION OF PHQ2 SCORE: 0

## 2023-04-19 ASSESSMENT — ENCOUNTER SYMPTOMS: GENERAL WELL-BEING: FAIR

## 2023-04-19 NOTE — PROGRESS NOTES
Chief Complaint   Patient presents with    Annual Wellness Visit       HPI:  Randall is a 72 y.o. here for Medicare Annual Wellness Visit        Patient Active Problem List    Diagnosis Date Noted    Inguinal hernia, left 04/19/2023    Low HDL (under 40) 11/10/2022    Mixed hyperlipidemia 11/10/2022    Chronic right-sided low back pain with right-sided sciatica 08/30/2022    Essential (primary) hypertension 08/30/2022    GERD (gastroesophageal reflux disease) 08/30/2022    Cervical stenosis of spine 08/30/2022    Hypothyroid 08/30/2022    Benign prostatic hyperplasia (BPH) with post-void dribbling 08/30/2022    Bleeding nose 08/30/2022    Neck pain 08/30/2022       Current Outpatient Medications   Medication Sig Dispense Refill    amLODIPine (NORVASC) 5 MG Tab Take 1 Tablet by mouth every day. 90 Tablet 3    Levothyroxine Sodium 150 MCG Cap Take 150 mcg by mouth every day. 90 Capsule 3    pantoprazole (PROTONIX) 40 MG Tablet Delayed Response Take 1 Tablet by mouth 2 times a day. 180 Tablet 3    fluticasone (FLONASE) 50 MCG/ACT nasal spray Administer 2 Sprays into affected nostril(S) every day. 48 g 3    ADVAIR -21 MCG/ACT inhaler Inhale 2 Puffs 2 times a day. 180 Each 3    tamsulosin (FLOMAX) 0.4 MG capsule TAKE 1 CAPSULE DAILY (FOLLOW UP FOR ADDITIONAL REFILLS)      aspirin 81 MG EC tablet Take 1 Tablet by mouth see administration instructions. Monday, Wednesday, Friday      cetirizine (ZYRTEC) 10 MG Tab       cyanocobalamin (VITAMIN B12) 1000 MCG Tab Take 1 Tablet by mouth.      Multiple Vitamins-Minerals (CENTRUM SILVER) Tab Take  by mouth.      Magnesium 250 MG Tab Take  by mouth.      Dentifrices (RA FLUORIDE TOOTHPASTE) 0.15 % Paste Apply  to teeth.       No current facility-administered medications for this visit.        Patient is taking medications as noted in medication list.  Current supplements as per medication list.     Allergies: Patient has no known allergies.    Current social  contact/activities: Working, is a . Gets out with his wife, has an upcoming vacation to [place].      Is patient current with immunizations? No, due for PREVNAR (PCV20) , TD, TDAP, and SHINGRIX (Shingles). Patient is interested in receiving PREVNAR (PCV20)  today.    He  reports that he has never smoked. He has never used smokeless tobacco. He reports that he does not use drugs.  Counseling given: Not Answered      ROS:    Gait: Uses no assistive device   Ostomy: No   Other tubes: No   Amputations: no   Chronic oxygen use No   Last eye exam 3/2023   Wears hearing aids: No   : Denies any urinary leakage during the last 6 months    Screenin  Depression Screening  Little interest or pleasure in doing things?0  0 - not at all  Feeling down, depressed, or hopeless? 0 - not at all0  Trouble falling or staying asleep, or sleeping too much?  0   Feeling tired or having little energy?  0   Poor appetite or overeating? 0    Feeling bad about yourself - or that you are a failure or have let yourself or your family down?  0  Trouble concentrating on things, such as reading the newspaper or watching television?  0  Moving or speaking so slowly that other people could have noticed.  Or the opposite - being so fidgety or restless that you have been moving around a lot more than usual?   0  Thoughts that you would be better off dead, or of hurting yourself?  0   Patient Health Questionnaire Score:  0    If depressive symptoms identified deferred to follow up visit unless specifically addressed in assessment and plan.    Interpretation of PHQ-9 Total Score   Score Severity   1-4 No Depression   5-9 Mild Depression   10-14 Moderate Depression   15-19 Moderately Severe Depression   20-27 Severe Depression    Screening for Cognitive Impairment  Three Minute Recall (Banana, Sunrise, Chair)  3 /3    Draw clock face with all 12 numbers and set the hands to show 20 past 8.  Completed      If cognitive concerns identified,  deferred for follow up unless specifically addressed in assessment and plan.    Fall Risk Assessment  Has the patient had two or more falls in the last year or any fall with injury in the last year? no No  If fall risk identified, deferred for follow up unless specifically addressed in assessment and plan.    Safety Assessment  Throw rugs on floor.  YesYes  Handrails on all stairs.  yesYes  Good lighting in all hallways. yes Yes  Difficulty hearing.  yesYes  Patient counseled about all safety risks that were identified.    Functional Assessment ADLs  Are there any barriers preventing you from cooking for yourself or meeting nutritional needs?  No    Are there any barriers preventing you from driving safely or obtaining transportation?  No.  Are there any barriers preventing you from using a telephone or calling for help?  No   Are there any barriers preventing you from shopping?  noNo.    Are there any barriers preventing you from taking care of your own finances?  No.   Are there any barriers preventing you from managing your medications?  No.  Are there any barriers preventing you from showering, bathing or dressing yourself?  No.  Are you currently engaging in any exercise or physical activity?  Yes.   What is your perception of your health?  Fair.    Advance Care Planning  Do you have an Advance Directive, Living Will, Durable Power of , or POLST? YesAll patient will be bringing it in   Advance Directive Living Will Durable Power of  POLST is on file    Health Maintenance Summary            Overdue - HEPATITIS C SCREENING (Once) Overdue - never done      No completion history exists for this topic.              Overdue - IMM DTaP/Tdap/Td Vaccine (1 - Tdap) Overdue - never done      No completion history exists for this topic.              Overdue - IMM ZOSTER VACCINES (1 of 2) Overdue - never done      No completion history exists for this topic.              IMM INFLUENZA (Season Ended) Next  due on 9/1/2023      No completion history exists for this topic.              Annual Wellness Visit (Every 366 Days) Next due on 4/19/2024 04/19/2023  Visit Dx: Encounter for Medicare annual wellness exam    04/19/2023  Level of Service: ANNUAL WELLNESS VISIT-INCLUDES PPPS SUBSEQUE*              COLORECTAL CANCER SCREENING (COLOGUARD STOOL DNA - Every 3 Years) Next due on 1/15/2025      01/15/2022  COLOGUARD COLON CANCER SCREENING              COVID-19 Vaccine (Series Information) Completed      12/02/2022  Imm Admin: PFIZER BIVALENT BOOSTER SARS-COV-2 VACCINE (12+)    10/23/2021  Imm Admin: PFIZER MILLER CAP SARS-COV-2 VACCINATION (12+)    04/15/2021  Imm Admin: PFIZER PURPLE CAP SARS-COV-2 VACCINATION (12+)    03/25/2021  Imm Admin: PFIZER MILLER CAP SARS-COV-2 VACCINATION (12+)              IMM PNEUMOCOCCAL VACCINE: 65+ Years (Series Information) Completed      04/19/2023  Imm Admin: Pneumococcal Conjugate Vaccine (PCV20)              IMM HEP B VACCINE (Series Information) Aged Out      No completion history exists for this topic.              IMM MENINGOCOCCAL ACWY VACCINE (Series Information) Aged Out      No completion history exists for this topic.                    Patient Care Team:  MELVIN Sandoval as PCP - General (Nurse Practitioner Family)    Social History     Tobacco Use    Smoking status: Never    Smokeless tobacco: Never   Vaping Use    Vaping Use: Never used   Substance Use Topics    Drug use: Never     History reviewed. No pertinent family history.  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   History reviewed. No pertinent surgical history.    Exam:   /68 (BP Location: Left arm, Patient Position:  "Sitting, BP Cuff Size: Adult)   Pulse 67   Temp 36.7 °C (98.1 °F) (Temporal)   Ht 1.753 m (5' 9\")   Wt 76.2 kg (168 lb)   SpO2 97%  Body mass index is 24.81 kg/m².    Hearing fair.  Has tinnitus in the right ear.   Dentition fair, has some dental concerns and plans to see a dentist.   Alert, oriented in no acute distress  Eye contact is good, speech goal directed, affect calm      Assessment and Plan. The following treatment and monitoring plan is recommended:        1. Encounter for Medicare annual wellness exam  Stable    2. Inguinal hernia, left  Chronic, unstable  Patient has had a left inguinal hernia for years.  He states that it is reducible but it is getting harder to reduce.  He is requesting a referral to general surgeon.  He states that he prefers a local anesthetic or nerve block and would like to have the open approach.  Discussed with patient that he will need to discuss this with his surgeon.  - Referral to General Surgery    3. Essential (primary) hypertension  Chronic, stable  Continue amlodipine 5 mg daily.  Blood pressure well controlled today 134/68.  - amLODIPine (NORVASC) 5 MG Tab; Take 1 Tablet by mouth every day.  Dispense: 90 Tablet; Refill: 3    4. Mixed hyperlipidemia  Chronic, unstable  Patient declined statin medication.  He states that he has seen cardiologists in the past who recommended it, but he feels that since there are no scientific based test that can confirm reduction in plaque buildup due to statin medication he is not interested at this time.  He is trying to manage his cholesterol through diet and exercise.  Labs are ordered to recheck cholesterol.    5. Hypothyroidism, unspecified type  Chronic, stable  Continue levothyroxine 150 mcg daily  - Levothyroxine Sodium 150 MCG Cap; Take 150 mcg by mouth every day.  Dispense: 90 Capsule; Refill: 3    6. Need for vaccination  - Pneumococcal Conjugate Vaccine 20-Valent (19 yrs+)       Services suggested: No services needed at " this time  Health Care Screening recommendations as per orders if indicated.  Referrals offered: PT/OT/Nutrition counseling/Behavioral Health/Smoking cessation as per orders if indicated.    Discussion today about general wellness and lifestyle habits:    Prevent falls and reduce trip hazards; Cautioned about securing or removing rugs.  Have a working fire alarm and carbon monoxide detector;   Engage in regular physical activity and social activities.     Follow-up: Return if symptoms worsen or fail to improve.

## 2023-04-28 ENCOUNTER — PATIENT MESSAGE (OUTPATIENT)
Dept: MEDICAL GROUP | Facility: MEDICAL CENTER | Age: 73
End: 2023-04-28
Payer: MEDICARE

## 2023-04-28 DIAGNOSIS — L98.9 SKIN LESION: ICD-10-CM

## 2023-05-01 DIAGNOSIS — L57.0 ACTINIC KERATOSIS: ICD-10-CM

## 2023-06-01 ENCOUNTER — APPOINTMENT (RX ONLY)
Dept: URBAN - METROPOLITAN AREA CLINIC 6 | Facility: CLINIC | Age: 73
Setting detail: DERMATOLOGY
End: 2023-06-01

## 2023-06-01 DIAGNOSIS — D18.0 HEMANGIOMA: ICD-10-CM

## 2023-06-01 DIAGNOSIS — L81.4 OTHER MELANIN HYPERPIGMENTATION: ICD-10-CM

## 2023-06-01 DIAGNOSIS — D22 MELANOCYTIC NEVI: ICD-10-CM

## 2023-06-01 DIAGNOSIS — L82.1 OTHER SEBORRHEIC KERATOSIS: ICD-10-CM

## 2023-06-01 DIAGNOSIS — Z71.89 OTHER SPECIFIED COUNSELING: ICD-10-CM

## 2023-06-01 DIAGNOSIS — Z85.828 PERSONAL HISTORY OF OTHER MALIGNANT NEOPLASM OF SKIN: ICD-10-CM

## 2023-06-01 PROBLEM — D22.5 MELANOCYTIC NEVI OF TRUNK: Status: ACTIVE | Noted: 2023-06-01

## 2023-06-01 PROBLEM — D18.01 HEMANGIOMA OF SKIN AND SUBCUTANEOUS TISSUE: Status: ACTIVE | Noted: 2023-06-01

## 2023-06-01 PROCEDURE — ? ADDITIONAL NOTES

## 2023-06-01 PROCEDURE — ? COUNSELING

## 2023-06-01 PROCEDURE — 99203 OFFICE O/P NEW LOW 30 MIN: CPT

## 2023-06-01 ASSESSMENT — LOCATION DETAILED DESCRIPTION DERM
LOCATION DETAILED: LEFT ULNAR DORSAL HAND
LOCATION DETAILED: INFERIOR THORACIC SPINE
LOCATION DETAILED: PERIUMBILICAL SKIN
LOCATION DETAILED: RIGHT TONSIL
LOCATION DETAILED: LEFT MEDIAL INFERIOR CHEST
LOCATION DETAILED: STERNUM
LOCATION DETAILED: RIGHT RADIAL DORSAL HAND
LOCATION DETAILED: EPIGASTRIC SKIN
LOCATION DETAILED: LEFT INFERIOR CENTRAL MALAR CHEEK

## 2023-06-01 ASSESSMENT — LOCATION SIMPLE DESCRIPTION DERM
LOCATION SIMPLE: LEFT CHEEK
LOCATION SIMPLE: ABDOMEN
LOCATION SIMPLE: RIGHT TONSIL
LOCATION SIMPLE: RIGHT HAND
LOCATION SIMPLE: UPPER BACK
LOCATION SIMPLE: CHEST
LOCATION SIMPLE: LEFT HAND

## 2023-06-01 ASSESSMENT — LOCATION ZONE DERM
LOCATION ZONE: FACE
LOCATION ZONE: MUCOUS_MEMBRANE
LOCATION ZONE: HAND
LOCATION ZONE: TRUNK

## 2023-06-01 NOTE — PROCEDURE: ADDITIONAL NOTES
Additional Notes: Will [facility] for bleaching skin
Detail Level: Simple
Render Risk Assessment In Note?: no

## 2023-06-25 DIAGNOSIS — I10 ESSENTIAL (PRIMARY) HYPERTENSION: ICD-10-CM

## 2023-06-26 RX ORDER — AMLODIPINE BESYLATE 5 MG/1
5 TABLET ORAL DAILY
Qty: 90 TABLET | Refills: 3 | Status: SHIPPED | OUTPATIENT
Start: 2023-06-26

## 2023-07-19 DIAGNOSIS — R09.82 POST-NASAL DRIP: ICD-10-CM

## 2023-07-19 DIAGNOSIS — K21.9 GASTROESOPHAGEAL REFLUX DISEASE WITHOUT ESOPHAGITIS: ICD-10-CM

## 2023-07-19 RX ORDER — FAMOTIDINE 20 MG/1
20 TABLET, FILM COATED ORAL
COMMUNITY
End: 2023-07-19 | Stop reason: SDUPTHER

## 2023-07-19 RX ORDER — FAMOTIDINE 20 MG/1
20 TABLET, FILM COATED ORAL
Qty: 90 TABLET | Refills: 3 | Status: SHIPPED | OUTPATIENT
Start: 2023-07-19 | End: 2024-07-18

## 2023-07-19 RX ORDER — CETIRIZINE HYDROCHLORIDE 10 MG/1
10 TABLET ORAL DAILY
Qty: 90 TABLET | Refills: 3 | Status: SHIPPED | OUTPATIENT
Start: 2023-07-19 | End: 2024-07-18

## 2023-08-04 DIAGNOSIS — K40.90 INGUINAL HERNIA, LEFT: ICD-10-CM

## 2023-10-09 RX ORDER — FLUTICASONE PROPIONATE 50 MCG
SPRAY, SUSPENSION (ML) NASAL
Qty: 48 G | Refills: 3 | Status: SHIPPED | OUTPATIENT
Start: 2023-10-09

## 2023-10-09 RX ORDER — FLUTICASONE PROPIONATE AND SALMETEROL XINAFOATE 230; 21 UG/1; UG/1
AEROSOL, METERED RESPIRATORY (INHALATION)
Qty: 180 G | Refills: 3 | Status: SHIPPED | OUTPATIENT
Start: 2023-10-09

## 2023-10-17 ENCOUNTER — PHARMACY VISIT (OUTPATIENT)
Dept: PHARMACY | Facility: MEDICAL CENTER | Age: 73
End: 2023-10-17
Payer: COMMERCIAL

## 2023-10-17 ENCOUNTER — PHARMACY VISIT (OUTPATIENT)
Dept: PHARMACY | Facility: MEDICAL CENTER | Age: 73
End: 2023-10-17
Payer: MEDICARE

## 2023-10-17 PROCEDURE — RXMED WILLOW AMBULATORY MEDICATION CHARGE: Performed by: INTERNAL MEDICINE

## 2023-10-17 RX ORDER — INFLUENZA A VIRUS A/MICHIGAN/45/2015 X-275 (H1N1) ANTIGEN (FORMALDEHYDE INACTIVATED), INFLUENZA A VIRUS A/SINGAPORE/INFIMH-16-0019/2016 IVR-186 (H3N2) ANTIGEN (FORMALDEHYDE INACTIVATED), INFLUENZA B VIRUS B/PHUKET/3073/2013 ANTIGEN (FORMALDEHYDE INACTIVATED), AND INFLUENZA B VIRUS B/MARYLAND/15/2016 BX-69A ANTIGEN (FORMALDEHYDE INACTIVATED) 60; 60; 60; 60 UG/.7ML; UG/.7ML; UG/.7ML; UG/.7ML
0.7 INJECTION, SUSPENSION INTRAMUSCULAR
Qty: 0.7 ML | Refills: 0 | OUTPATIENT
Start: 2023-10-17

## 2023-10-17 RX ORDER — CLOSTRIDIUM TETANI TOXOID ANTIGEN (FORMALDEHYDE INACTIVATED), CORYNEBACTERIUM DIPHTHERIAE TOXOID ANTIGEN (FORMALDEHYDE INACTIVATED), BORDETELLA PERTUSSIS TOXOID ANTIGEN (GLUTARALDEHYDE INACTIVATED), BORDETELLA PERTUSSIS FILAMENTOUS HEMAGGLUTININ ANTIGEN (FORMALDEHYDE INACTIVATED), BORDETELLA PERTUSSIS PERTACTIN ANTIGEN, AND BORDETELLA PERTUSSIS FIMBRIAE 2/3 ANTIGEN 5; 2; 2.5; 5; 3; 5 [LF]/.5ML; [LF]/.5ML; UG/.5ML; UG/.5ML; UG/.5ML; UG/.5ML
0.5 INJECTION, SUSPENSION INTRAMUSCULAR
Qty: 0.5 ML | Refills: 0 | OUTPATIENT
Start: 2023-10-17

## 2023-10-17 RX ORDER — COVID-19 VACCINE, MRNA 0.23 MG/2.25ML
0.3 INJECTION, SUSPENSION INTRAMUSCULAR
Qty: 0.3 ML | Refills: 0 | OUTPATIENT
Start: 2023-10-17

## 2023-10-17 RX ORDER — ZOSTER VACCINE RECOMBINANT, ADJUVANTED 50 MCG/0.5
0.5 KIT INTRAMUSCULAR
Qty: 0.5 ML | Refills: 0 | OUTPATIENT
Start: 2023-10-17

## 2023-10-17 RX ORDER — RESPIRATORY SYNCYTIAL VISUS VACCINE RECOMBINANT, ADJUVANTED 120MCG/0.5
0.5 KIT INTRAMUSCULAR
Qty: 0.5 ML | Refills: 0 | OUTPATIENT
Start: 2023-10-17

## 2023-11-29 ENCOUNTER — PATIENT MESSAGE (OUTPATIENT)
Dept: HEALTH INFORMATION MANAGEMENT | Facility: OTHER | Age: 73
End: 2023-11-29

## 2024-04-15 DIAGNOSIS — K21.9 GASTROESOPHAGEAL REFLUX DISEASE WITHOUT ESOPHAGITIS: ICD-10-CM

## 2024-04-16 RX ORDER — PANTOPRAZOLE SODIUM 40 MG/1
40 TABLET, DELAYED RELEASE ORAL 2 TIMES DAILY
Qty: 180 TABLET | Refills: 3 | Status: SHIPPED | OUTPATIENT
Start: 2024-04-16

## 2024-04-16 NOTE — TELEPHONE ENCOUNTER
Received request via: Pharmacy    Was the patient seen in the last year in this department? Yes    Does the patient have an active prescription (recently filled or refills available) for medication(s) requested? No    Pharmacy Name:  [facility] - [address]     Does the patient have [facility] Plus and need 100 day supply (blood pressure, diabetes and cholesterol meds only)? Patient does not have SCP

## 2024-04-22 DIAGNOSIS — E03.9 HYPOTHYROIDISM, UNSPECIFIED TYPE: ICD-10-CM

## 2024-04-22 DIAGNOSIS — I10 ESSENTIAL (PRIMARY) HYPERTENSION: ICD-10-CM

## 2024-04-22 RX ORDER — LEVOTHYROXINE SODIUM 13 UG/1
1 CAPSULE ORAL DAILY
Qty: 90 CAPSULE | Refills: 3 | Status: SHIPPED | OUTPATIENT
Start: 2024-04-22

## 2024-04-22 RX ORDER — AMLODIPINE BESYLATE 5 MG/1
5 TABLET ORAL DAILY
Qty: 90 TABLET | Refills: 3 | Status: SHIPPED | OUTPATIENT
Start: 2024-04-22

## 2024-04-22 NOTE — TELEPHONE ENCOUNTER
Received request via: Pharmacy    Was the patient seen in the last year in this department? Yes    Does the patient have an active prescription (recently filled or refills available) for medication(s) requested? No    Pharmacy Name: [facility] - [address]     Does the patient have alf Plus and need 100 day supply (blood pressure, diabetes and cholesterol meds only)? Patient does not have SCP

## 2024-09-24 DIAGNOSIS — R09.82 POST-NASAL DRIP: ICD-10-CM

## 2024-09-24 RX ORDER — CETIRIZINE HYDROCHLORIDE 10 MG/1
10 TABLET ORAL DAILY
Qty: 90 TABLET | Refills: 3 | Status: SHIPPED | OUTPATIENT
Start: 2024-09-24

## 2024-10-07 ENCOUNTER — HOSPITAL ENCOUNTER (OUTPATIENT)
Dept: LAB | Facility: MEDICAL CENTER | Age: 74
End: 2024-10-07
Payer: MEDICARE

## 2024-10-07 ENCOUNTER — OFFICE VISIT (OUTPATIENT)
Dept: MEDICAL GROUP | Facility: MEDICAL CENTER | Age: 74
End: 2024-10-07
Payer: MEDICARE

## 2024-10-07 VITALS
TEMPERATURE: 97.8 F | SYSTOLIC BLOOD PRESSURE: 130 MMHG | DIASTOLIC BLOOD PRESSURE: 82 MMHG | HEART RATE: 77 BPM | HEIGHT: 69 IN | OXYGEN SATURATION: 96 % | BODY MASS INDEX: 23.85 KG/M2 | WEIGHT: 161 LBS

## 2024-10-07 DIAGNOSIS — Z11.59 NEED FOR HEPATITIS C SCREENING TEST: ICD-10-CM

## 2024-10-07 DIAGNOSIS — Z13.228 SCREENING FOR ENDOCRINE, METABOLIC AND IMMUNITY DISORDER: ICD-10-CM

## 2024-10-07 DIAGNOSIS — E78.2 MIXED HYPERLIPIDEMIA: ICD-10-CM

## 2024-10-07 DIAGNOSIS — Z00.00 ENCOUNTER FOR MEDICARE ANNUAL WELLNESS EXAM: ICD-10-CM

## 2024-10-07 DIAGNOSIS — R05.3 CHRONIC COUGH: ICD-10-CM

## 2024-10-07 DIAGNOSIS — Z13.29 SCREENING FOR ENDOCRINE, METABOLIC AND IMMUNITY DISORDER: ICD-10-CM

## 2024-10-07 DIAGNOSIS — Z23 NEED FOR VACCINATION: ICD-10-CM

## 2024-10-07 DIAGNOSIS — Z12.5 PROSTATE CANCER SCREENING: ICD-10-CM

## 2024-10-07 DIAGNOSIS — M65.30 TRIGGER FINGER, UNSPECIFIED FINGER, UNSPECIFIED LATERALITY: ICD-10-CM

## 2024-10-07 DIAGNOSIS — Z13.0 SCREENING FOR ENDOCRINE, METABOLIC AND IMMUNITY DISORDER: ICD-10-CM

## 2024-10-07 DIAGNOSIS — R09.81 NASAL CONGESTION: ICD-10-CM

## 2024-10-07 LAB
ALBUMIN SERPL BCP-MCNC: 4.5 G/DL (ref 3.2–4.9)
ALBUMIN/GLOB SERPL: 1.5 G/DL
ALP SERPL-CCNC: 67 U/L (ref 30–99)
ALT SERPL-CCNC: 22 U/L (ref 2–50)
ANION GAP SERPL CALC-SCNC: 13 MMOL/L (ref 7–16)
AST SERPL-CCNC: 23 U/L (ref 12–45)
BILIRUB SERPL-MCNC: 0.4 MG/DL (ref 0.1–1.5)
BUN SERPL-MCNC: 17 MG/DL (ref 8–22)
CALCIUM ALBUM COR SERPL-MCNC: 10.4 MG/DL (ref 8.5–10.5)
CALCIUM SERPL-MCNC: 10.8 MG/DL (ref 8.5–10.5)
CHLORIDE SERPL-SCNC: 104 MMOL/L (ref 96–112)
CHOLEST SERPL-MCNC: 217 MG/DL (ref 100–199)
CO2 SERPL-SCNC: 25 MMOL/L (ref 20–33)
CREAT SERPL-MCNC: 0.83 MG/DL (ref 0.5–1.4)
CRP SERPL HS-MCNC: 0.8 MG/L (ref 0–3)
ERYTHROCYTE [DISTWIDTH] IN BLOOD BY AUTOMATED COUNT: 46 FL (ref 35.9–50)
GFR SERPLBLD CREATININE-BSD FMLA CKD-EPI: 91 ML/MIN/1.73 M 2
GLOBULIN SER CALC-MCNC: 3 G/DL (ref 1.9–3.5)
GLUCOSE SERPL-MCNC: 99 MG/DL (ref 65–99)
HCT VFR BLD AUTO: 49.4 % (ref 42–52)
HCV AB SER QL: NONREACTIVE
HDLC SERPL-MCNC: 34 MG/DL
HGB BLD-MCNC: 16.8 G/DL (ref 14–18)
LDLC SERPL CALC-MCNC: 166 MG/DL
MCH RBC QN AUTO: 30.9 PG (ref 27–33)
MCHC RBC AUTO-ENTMCNC: 34 G/DL (ref 32.3–36.5)
MCV RBC AUTO: 90.8 FL (ref 81.4–97.8)
PLATELET # BLD AUTO: 227 K/UL (ref 164–446)
PMV BLD AUTO: 11.7 FL (ref 9–12.9)
POTASSIUM SERPL-SCNC: 4.8 MMOL/L (ref 3.6–5.5)
PROT SERPL-MCNC: 7.5 G/DL (ref 6–8.2)
RBC # BLD AUTO: 5.44 M/UL (ref 4.7–6.1)
SODIUM SERPL-SCNC: 142 MMOL/L (ref 135–145)
T4 FREE SERPL-MCNC: 2.13 NG/DL (ref 0.93–1.7)
TRIGL SERPL-MCNC: 86 MG/DL (ref 0–149)
TSH SERPL DL<=0.005 MIU/L-ACNC: 0.03 UIU/ML (ref 0.38–5.33)
WBC # BLD AUTO: 6.2 K/UL (ref 4.8–10.8)

## 2024-10-07 PROCEDURE — 84154 ASSAY OF PSA FREE: CPT

## 2024-10-07 PROCEDURE — 84439 ASSAY OF FREE THYROXINE: CPT

## 2024-10-07 PROCEDURE — 86803 HEPATITIS C AB TEST: CPT

## 2024-10-07 PROCEDURE — 80053 COMPREHEN METABOLIC PANEL: CPT

## 2024-10-07 PROCEDURE — 90662 IIV NO PRSV INCREASED AG IM: CPT

## 2024-10-07 PROCEDURE — 80061 LIPID PANEL: CPT

## 2024-10-07 PROCEDURE — 3075F SYST BP GE 130 - 139MM HG: CPT

## 2024-10-07 PROCEDURE — 36415 COLL VENOUS BLD VENIPUNCTURE: CPT

## 2024-10-07 PROCEDURE — 84153 ASSAY OF PSA TOTAL: CPT | Mod: GA

## 2024-10-07 PROCEDURE — 86141 C-REACTIVE PROTEIN HS: CPT

## 2024-10-07 PROCEDURE — 85027 COMPLETE CBC AUTOMATED: CPT

## 2024-10-07 PROCEDURE — 3079F DIAST BP 80-89 MM HG: CPT

## 2024-10-07 PROCEDURE — 83695 ASSAY OF LIPOPROTEIN(A): CPT

## 2024-10-07 PROCEDURE — G0008 ADMIN INFLUENZA VIRUS VAC: HCPCS

## 2024-10-07 PROCEDURE — 84443 ASSAY THYROID STIM HORMONE: CPT

## 2024-10-07 PROCEDURE — G0439 PPPS, SUBSEQ VISIT: HCPCS | Mod: 25

## 2024-10-07 RX ORDER — CHLORHEXIDINE GLUCONATE ORAL RINSE 1.2 MG/ML
SOLUTION DENTAL
COMMUNITY
Start: 2024-04-22 | End: 2024-10-07

## 2024-10-07 RX ORDER — OFLOXACIN 3 MG/ML
SOLUTION/ DROPS OPHTHALMIC
COMMUNITY
End: 2024-10-07

## 2024-10-07 RX ORDER — PREDNISOLONE ACETATE 10 MG/ML
SUSPENSION/ DROPS OPHTHALMIC
COMMUNITY
End: 2024-10-07

## 2024-10-07 ASSESSMENT — ENCOUNTER SYMPTOMS: GENERAL WELL-BEING: GOOD

## 2024-10-07 ASSESSMENT — PATIENT HEALTH QUESTIONNAIRE - PHQ9: CLINICAL INTERPRETATION OF PHQ2 SCORE: 0

## 2024-10-07 ASSESSMENT — ACTIVITIES OF DAILY LIVING (ADL): BATHING_REQUIRES_ASSISTANCE: 0

## 2024-10-10 DIAGNOSIS — E83.52 HYPERCALCEMIA: ICD-10-CM

## 2024-10-10 DIAGNOSIS — R79.89 LOW TSH LEVEL: ICD-10-CM

## 2024-10-10 LAB
LPA SERPL-MCNC: 43 MG/DL
PSA FREE MFR SERPL: 26 %
PSA FREE SERPL-MCNC: 0.9 NG/ML
PSA SERPL-MCNC: 3.4 NG/ML (ref 0–4)

## 2024-12-03 ENCOUNTER — HOSPITAL ENCOUNTER (OUTPATIENT)
Dept: LAB | Facility: MEDICAL CENTER | Age: 74
End: 2024-12-03
Payer: MEDICARE

## 2024-12-03 DIAGNOSIS — R79.89 LOW TSH LEVEL: ICD-10-CM

## 2024-12-03 DIAGNOSIS — E83.52 HYPERCALCEMIA: ICD-10-CM

## 2024-12-03 LAB
25(OH)D3 SERPL-MCNC: 57 NG/ML (ref 30–100)
CA-I SERPL-SCNC: 1.2 MMOL/L (ref 1.1–1.3)
PHOSPHATE SERPL-MCNC: 2.9 MG/DL (ref 2.5–4.5)
T3FREE SERPL-MCNC: 3.05 PG/ML (ref 2–4.4)
T4 FREE SERPL-MCNC: 1.57 NG/DL (ref 0.93–1.7)
TSH SERPL DL<=0.005 MIU/L-ACNC: 2.46 UIU/ML (ref 0.38–5.33)

## 2024-12-03 PROCEDURE — 82306 VITAMIN D 25 HYDROXY: CPT

## 2024-12-03 PROCEDURE — 84439 ASSAY OF FREE THYROXINE: CPT

## 2024-12-03 PROCEDURE — 83970 ASSAY OF PARATHORMONE: CPT

## 2024-12-03 PROCEDURE — 84100 ASSAY OF PHOSPHORUS: CPT

## 2024-12-03 PROCEDURE — 84443 ASSAY THYROID STIM HORMONE: CPT

## 2024-12-03 PROCEDURE — 82330 ASSAY OF CALCIUM: CPT

## 2024-12-03 PROCEDURE — 36415 COLL VENOUS BLD VENIPUNCTURE: CPT

## 2024-12-03 PROCEDURE — 84481 FREE ASSAY (FT-3): CPT

## 2024-12-04 LAB — PTH-INTACT SERPL-MCNC: 46.9 PG/ML (ref 14–72)

## 2025-01-19 DIAGNOSIS — R09.81 NASAL CONGESTION: ICD-10-CM

## 2025-01-20 RX ORDER — FLUTICASONE PROPIONATE 50 MCG
SPRAY, SUSPENSION (ML) NASAL
Qty: 48 G | Refills: 4 | Status: SHIPPED | OUTPATIENT
Start: 2025-01-20

## 2025-03-31 ENCOUNTER — PATIENT MESSAGE (OUTPATIENT)
Dept: MEDICAL GROUP | Facility: MEDICAL CENTER | Age: 75
End: 2025-03-31
Payer: MEDICARE

## 2025-03-31 DIAGNOSIS — K21.9 GASTROESOPHAGEAL REFLUX DISEASE WITHOUT ESOPHAGITIS: ICD-10-CM

## 2025-04-01 DIAGNOSIS — Z12.11 SCREEN FOR COLON CANCER: ICD-10-CM

## 2025-04-01 RX ORDER — FAMOTIDINE 20 MG/1
20 TABLET, FILM COATED ORAL
Qty: 90 TABLET | Refills: 3 | Status: SHIPPED | OUTPATIENT
Start: 2025-04-01 | End: 2026-04-01

## 2025-04-01 RX ORDER — FLUTICASONE PROPIONATE AND SALMETEROL XINAFOATE 230; 21 UG/1; UG/1
AEROSOL, METERED RESPIRATORY (INHALATION)
Qty: 36 G | Refills: 3 | Status: SHIPPED | OUTPATIENT
Start: 2025-04-01

## 2025-06-12 ENCOUNTER — APPOINTMENT (OUTPATIENT)
Dept: RADIOLOGY | Facility: MEDICAL CENTER | Age: 75
End: 2025-06-12
Attending: OTOLARYNGOLOGY
Payer: MEDICARE

## 2025-07-29 ENCOUNTER — HOSPITAL ENCOUNTER (OUTPATIENT)
Dept: RADIOLOGY | Facility: MEDICAL CENTER | Age: 75
End: 2025-07-29
Attending: OTOLARYNGOLOGY
Payer: MEDICARE

## 2025-07-29 DIAGNOSIS — R13.10 DYSPHAGIA, IDIOPATHIC: ICD-10-CM

## 2025-07-29 PROCEDURE — 74220 X-RAY XM ESOPHAGUS 1CNTRST: CPT

## 2025-07-29 PROCEDURE — 700117 HCHG RX CONTRAST REV CODE 255: Performed by: OTOLARYNGOLOGY

## 2025-07-29 RX ADMIN — BARIUM SULFATE 700 MG: 700 TABLET ORAL at 11:51

## 2025-08-20 DIAGNOSIS — E03.9 HYPOTHYROIDISM, UNSPECIFIED TYPE: ICD-10-CM

## 2025-08-20 DIAGNOSIS — I10 ESSENTIAL (PRIMARY) HYPERTENSION: ICD-10-CM

## 2025-08-20 RX ORDER — LEVOTHYROXINE SODIUM 13 UG/1
1 CAPSULE ORAL DAILY
Qty: 90 CAPSULE | Refills: 1 | Status: SHIPPED | OUTPATIENT
Start: 2025-08-20 | End: 2025-08-22 | Stop reason: SDUPTHER

## 2025-08-20 RX ORDER — AMLODIPINE BESYLATE 5 MG/1
5 TABLET ORAL DAILY
Qty: 90 TABLET | Refills: 1 | Status: SHIPPED | OUTPATIENT
Start: 2025-08-20

## 2025-08-22 DIAGNOSIS — E03.9 HYPOTHYROIDISM, UNSPECIFIED TYPE: ICD-10-CM

## 2025-08-22 RX ORDER — LEVOTHYROXINE SODIUM 13 UG/1
1 CAPSULE ORAL DAILY
Qty: 7 CAPSULE | Refills: 0 | Status: SHIPPED | OUTPATIENT
Start: 2025-08-22 | End: 2025-08-26 | Stop reason: SDUPTHER

## 2025-08-26 DIAGNOSIS — E03.9 HYPOTHYROIDISM, UNSPECIFIED TYPE: ICD-10-CM

## 2025-08-26 RX ORDER — LEVOTHYROXINE SODIUM 13 UG/1
1 CAPSULE ORAL DAILY
Qty: 30 CAPSULE | Refills: 0 | Status: SHIPPED | OUTPATIENT
Start: 2025-08-26